# Patient Record
Sex: MALE | Race: BLACK OR AFRICAN AMERICAN | NOT HISPANIC OR LATINO | URBAN - METROPOLITAN AREA
[De-identification: names, ages, dates, MRNs, and addresses within clinical notes are randomized per-mention and may not be internally consistent; named-entity substitution may affect disease eponyms.]

---

## 2022-02-19 ENCOUNTER — EMERGENCY (EMERGENCY)
Facility: HOSPITAL | Age: 62
LOS: 1 days | Discharge: HOME | End: 2022-02-19
Admitting: EMERGENCY MEDICINE
Payer: MEDICAID

## 2022-02-19 VITALS
HEART RATE: 78 BPM | OXYGEN SATURATION: 98 % | WEIGHT: 160.06 LBS | TEMPERATURE: 98 F | HEIGHT: 72 IN | RESPIRATION RATE: 18 BRPM | DIASTOLIC BLOOD PRESSURE: 84 MMHG | SYSTOLIC BLOOD PRESSURE: 128 MMHG

## 2022-02-19 PROCEDURE — 99283 EMERGENCY DEPT VISIT LOW MDM: CPT

## 2022-02-19 NOTE — ED ADULT TRIAGE NOTE - CHIEF COMPLAINT QUOTE
[place].  with 18g to L forearm placed. pt asked what brought him into the emergency department, has no medical complaints at this time. no obvious signs of injury/trauma.

## 2022-02-19 NOTE — ED PROVIDER NOTE - CLINICAL SUMMARY MEDICAL DECISION MAKING FREE TEXT BOX
60 y/o M presents to ED via EMS. Pt denies any complaints.  MSE performed and pt discharged.    Patient opt to leave the emergency department without discharge instructions.  The patient is aware discharge instructions can be accessed electronically via the [employer] or may chose to return to the emergency department or Health Records department for a copy at a later date.

## 2022-02-19 NOTE — ED PROVIDER NOTE - OBJECTIVE STATEMENT
60 y/o M, undomiciled, [place] [facility]  Per EMS, bystanders called EMS but the patient does not have a complaint.  The patient is oriented and states he did not request medical attention.

## 2022-02-19 NOTE — ED PROVIDER NOTE - PHYSICAL EXAMINATION
Constitutional: Well appearing, poor hygiene, well groomed and dressed, awake and alert to person  Head:  NC/AT, symmetric, neck supple  ENMT: Airway patent    Eyes:  Clear bilaterally, pupils, equal 3mm bilat    Cardiac:  Normal rate  Respiratory:  Normal respiratory rate, depth and effort  GI:   Abd soft, non-distended    MSK:  atraumatic  Neuro:  clear speech, alert and oriented to person  Skin:  Skin normal color for race, warm, dry and intact

## 2022-02-19 NOTE — ED PROVIDER NOTE - PATIENT PORTAL LINK FT
You can access the FollowMyHealth Patient Portal offered by [facility] by registering at the following website: [email or web address]. By joining [employer]’s FollowMyHealth portal, you will also be able to view your health information using other applications (apps) compatible with our system.

## 2022-02-23 DIAGNOSIS — Z00.00 ENCOUNTER FOR GENERAL ADULT MEDICAL EXAMINATION WITHOUT ABNORMAL FINDINGS: ICD-10-CM

## 2022-02-23 DIAGNOSIS — Z59.00 HOMELESSNESS UNSPECIFIED: ICD-10-CM

## 2022-02-23 SDOH — ECONOMIC STABILITY - HOUSING INSECURITY: HOMELESSNESS UNSPECIFIED: Z59.00

## 2023-02-10 ENCOUNTER — EMERGENCY (EMERGENCY)
Facility: HOSPITAL | Age: 63
LOS: 1 days | Discharge: HOME | End: 2023-02-10
Admitting: EMERGENCY MEDICINE
Payer: MEDICAID

## 2023-02-10 VITALS
SYSTOLIC BLOOD PRESSURE: 138 MMHG | WEIGHT: 160.06 LBS | OXYGEN SATURATION: 98 % | DIASTOLIC BLOOD PRESSURE: 88 MMHG | HEART RATE: 67 BPM | HEIGHT: 72 IN | TEMPERATURE: 98 F | RESPIRATION RATE: 18 BRPM

## 2023-02-10 PROCEDURE — 99284 EMERGENCY DEPT VISIT MOD MDM: CPT

## 2023-02-10 NOTE — ED PROVIDER NOTE - OBJECTIVE STATEMENT
62-year-old male, undomiciled, presenting to the emergency room after he was found sleeping in front of a local hotel.  Patient states he was only looking for a place to sleep and does not have any medical complaints.  He is requesting discharge at this time.

## 2023-02-10 NOTE — ED PROVIDER NOTE - CLINICAL SUMMARY MEDICAL DECISION MAKING FREE TEXT BOX
Patient presented to the emergency room after he was found sleeping in front of a hotel.  Patient is noted to be in his usual state of health and denies having any medical complaints.  Vital signs in triage are found to be stable.  Will discharge patient and he leaves in no acute distress.

## 2023-02-10 NOTE — ED ADULT NURSE NOTE - OBJECTIVE STATEMENT
Pt walked in. Upon primary rn assessment, pt refusing to answer RN questions. When asked why pt is here, pt refusing to answer. Turning his body away, closing his eyes, and going back to sleep. Pt uncooperative.

## 2023-02-10 NOTE — ED ADULT TRIAGE NOTE - CHIEF COMPLAINT QUOTE
BIBA from [facility] after staff called due to patient sleeping. pt denies drug or alcohol use, sleepy in triage.  in the field. uncooperative in triage. as per EMS, ambulatory on scene. no obvious signs of injury/trauma noted

## 2023-02-10 NOTE — ED ADULT TRIAGE NOTE - CCCP TRG CHIEF CMPLNT
medical evaluation

## 2023-02-10 NOTE — ED PROVIDER NOTE - PATIENT PORTAL LINK FT
You can access the FollowMyHealth Patient Portal offered by [facility] by registering at the following website: [email or web address]. By joining [email or web address]’s FollowMyHealth portal, you will also be able to view your health information using other applications (apps) compatible with our system.

## 2023-02-10 NOTE — ED ADULT NURSE NOTE - CHIEF COMPLAINT QUOTE
BIBA from [facility] after staff called due to patient sleeping. pt denies drug or alcohol use, sleepy in triage.  in the field. uncooperative in triage. as per EMS, ambulatory on scene. no obvious signs of injury/trauma noted

## 2023-02-10 NOTE — ED PROVIDER NOTE - PHYSICAL EXAMINATION
VITAL SIGNS: I have reviewed nursing notes and confirm.  CONSTITUTIONAL: Well-developed; well-nourished; in no acute distress.  SKIN: No acute rash.  HEAD: Normocephalic; atraumatic.  CARD: No extremity cyanosis.  RESP: Speaks in full, clear sentences.  EXT: Moves all extremities normally.  NEURO: Alert, oriented. Grossly unremarkable. No focal deficits. Fluent speech.   PSYCH: Cooperative, appropriate. Mood and affect wnl.

## 2023-02-13 DIAGNOSIS — Z00.00 ENCOUNTER FOR GENERAL ADULT MEDICAL EXAMINATION WITHOUT ABNORMAL FINDINGS: ICD-10-CM

## 2023-02-13 DIAGNOSIS — Z59.00 HOMELESSNESS UNSPECIFIED: ICD-10-CM

## 2023-02-13 SDOH — ECONOMIC STABILITY - HOUSING INSECURITY: HOMELESSNESS UNSPECIFIED: Z59.00

## 2025-03-01 ENCOUNTER — EMERGENCY (EMERGENCY)
Facility: HOSPITAL | Age: 65
LOS: 1 days | Discharge: HOME | End: 2025-03-01
Attending: EMERGENCY MEDICINE | Admitting: EMERGENCY MEDICINE
Payer: MEDICAID

## 2025-03-01 VITALS
TEMPERATURE: 98 F | RESPIRATION RATE: 15 BRPM | OXYGEN SATURATION: 98 % | SYSTOLIC BLOOD PRESSURE: 132 MMHG | WEIGHT: 176.37 LBS | HEIGHT: 70 IN | DIASTOLIC BLOOD PRESSURE: 73 MMHG | HEART RATE: 71 BPM

## 2025-03-01 VITALS
SYSTOLIC BLOOD PRESSURE: 117 MMHG | TEMPERATURE: 98 F | HEART RATE: 82 BPM | RESPIRATION RATE: 16 BRPM | OXYGEN SATURATION: 97 % | DIASTOLIC BLOOD PRESSURE: 88 MMHG

## 2025-03-01 LAB
ALBUMIN SERPL ELPH-MCNC: 3.9 G/DL — SIGNIFICANT CHANGE UP (ref 3.4–5)
ALP SERPL-CCNC: 103 U/L — SIGNIFICANT CHANGE UP (ref 40–120)
ALT FLD-CCNC: 19 U/L — SIGNIFICANT CHANGE UP (ref 12–42)
ANION GAP SERPL CALC-SCNC: 12 MMOL/L — SIGNIFICANT CHANGE UP (ref 9–16)
APPEARANCE UR: CLEAR — SIGNIFICANT CHANGE UP
AST SERPL-CCNC: 45 U/L — HIGH (ref 15–37)
BASOPHILS # BLD AUTO: 0.03 K/UL — SIGNIFICANT CHANGE UP (ref 0–0.2)
BASOPHILS NFR BLD AUTO: 0.5 % — SIGNIFICANT CHANGE UP (ref 0–2)
BILIRUB SERPL-MCNC: 0.9 MG/DL — SIGNIFICANT CHANGE UP (ref 0.2–1.2)
BILIRUB UR-MCNC: NEGATIVE — SIGNIFICANT CHANGE UP
BUN SERPL-MCNC: 18 MG/DL — SIGNIFICANT CHANGE UP (ref 7–23)
CALCIUM SERPL-MCNC: 9.1 MG/DL — SIGNIFICANT CHANGE UP (ref 8.5–10.5)
CHLORIDE SERPL-SCNC: 106 MMOL/L — SIGNIFICANT CHANGE UP (ref 96–108)
CO2 SERPL-SCNC: 24 MMOL/L — SIGNIFICANT CHANGE UP (ref 22–31)
COLOR SPEC: YELLOW — SIGNIFICANT CHANGE UP
CREAT SERPL-MCNC: 1.15 MG/DL — SIGNIFICANT CHANGE UP (ref 0.5–1.3)
DIFF PNL FLD: NEGATIVE — SIGNIFICANT CHANGE UP
EGFR: 72 ML/MIN/1.73M2 — SIGNIFICANT CHANGE UP
EOSINOPHIL # BLD AUTO: 0.03 K/UL — SIGNIFICANT CHANGE UP (ref 0–0.5)
EOSINOPHIL NFR BLD AUTO: 0.5 % — SIGNIFICANT CHANGE UP (ref 0–6)
GLUCOSE SERPL-MCNC: 87 MG/DL — SIGNIFICANT CHANGE UP (ref 70–99)
GLUCOSE UR QL: NEGATIVE MG/DL — SIGNIFICANT CHANGE UP
HCT VFR BLD CALC: 31.5 % — LOW (ref 39–50)
HGB BLD-MCNC: 9.6 G/DL — LOW (ref 13–17)
IMM GRANULOCYTES # BLD AUTO: 0.02 K/UL — SIGNIFICANT CHANGE UP (ref 0–0.07)
IMM GRANULOCYTES NFR BLD AUTO: 0.3 % — SIGNIFICANT CHANGE UP (ref 0–0.9)
KETONES UR-MCNC: 15 MG/DL
LACTATE BLDV-MCNC: 1 MMOL/L — SIGNIFICANT CHANGE UP (ref 0.5–2)
LACTATE BLDV-MCNC: 3.3 MMOL/L — HIGH (ref 0.5–2)
LEUKOCYTE ESTERASE UR-ACNC: NEGATIVE — SIGNIFICANT CHANGE UP
LYMPHOCYTES # BLD AUTO: 0.92 K/UL — LOW (ref 1–3.3)
LYMPHOCYTES NFR BLD AUTO: 14.7 % — SIGNIFICANT CHANGE UP (ref 13–44)
MCHC RBC-ENTMCNC: 25.4 PG — LOW (ref 27–34)
MCHC RBC-ENTMCNC: 30.5 G/DL — LOW (ref 32–36)
MCV RBC AUTO: 83.3 FL — SIGNIFICANT CHANGE UP (ref 80–100)
MONOCYTES # BLD AUTO: 0.41 K/UL — SIGNIFICANT CHANGE UP (ref 0–0.9)
MONOCYTES NFR BLD AUTO: 6.6 % — SIGNIFICANT CHANGE UP (ref 2–14)
NEUTROPHILS # BLD AUTO: 4.83 K/UL — SIGNIFICANT CHANGE UP (ref 1.8–7.4)
NEUTROPHILS NFR BLD AUTO: 77.4 % — HIGH (ref 43–77)
NITRITE UR-MCNC: NEGATIVE — SIGNIFICANT CHANGE UP
NRBC # BLD AUTO: 0 K/UL — SIGNIFICANT CHANGE UP (ref 0–0)
NRBC # FLD: 0 K/UL — SIGNIFICANT CHANGE UP (ref 0–0)
NRBC BLD AUTO-RTO: 0 /100 WBCS — SIGNIFICANT CHANGE UP (ref 0–0)
PCP SPEC-MCNC: SIGNIFICANT CHANGE UP
PH UR: 5.5 — SIGNIFICANT CHANGE UP (ref 5–8)
PLATELET # BLD AUTO: 205 K/UL — SIGNIFICANT CHANGE UP (ref 150–400)
PMV BLD: 10.6 FL — SIGNIFICANT CHANGE UP (ref 7–13)
POTASSIUM SERPL-MCNC: 4.2 MMOL/L — SIGNIFICANT CHANGE UP (ref 3.5–5.3)
POTASSIUM SERPL-SCNC: 4.2 MMOL/L — SIGNIFICANT CHANGE UP (ref 3.5–5.3)
PROT SERPL-MCNC: 7.1 G/DL — SIGNIFICANT CHANGE UP (ref 6.4–8.2)
PROT UR-MCNC: NEGATIVE MG/DL — SIGNIFICANT CHANGE UP
RBC # BLD: 3.78 M/UL — LOW (ref 4.2–5.8)
RBC # FLD: 15.4 % — HIGH (ref 10.3–14.5)
SODIUM SERPL-SCNC: 142 MMOL/L — SIGNIFICANT CHANGE UP (ref 132–145)
SP GR SPEC: 1.02 — SIGNIFICANT CHANGE UP (ref 1–1.03)
TROPONIN I, HIGH SENSITIVITY RESULT: 25.6 NG/L — SIGNIFICANT CHANGE UP
UROBILINOGEN FLD QL: 1 MG/DL — SIGNIFICANT CHANGE UP (ref 0.2–1)
WBC # BLD: 6.24 K/UL — SIGNIFICANT CHANGE UP (ref 3.8–10.5)
WBC # FLD AUTO: 6.24 K/UL — SIGNIFICANT CHANGE UP (ref 3.8–10.5)

## 2025-03-01 PROCEDURE — 99285 EMERGENCY DEPT VISIT HI MDM: CPT

## 2025-03-01 PROCEDURE — 71046 X-RAY EXAM CHEST 2 VIEWS: CPT | Mod: 26

## 2025-03-01 RX ORDER — LEVETIRACETAM 10 MG/ML
500 INJECTION, SOLUTION INTRAVENOUS ONCE
Refills: 0 | Status: COMPLETED | OUTPATIENT
Start: 2025-03-01 | End: 2025-03-01

## 2025-03-01 RX ORDER — ACETAMINOPHEN 500 MG/5ML
650 LIQUID (ML) ORAL ONCE
Refills: 0 | Status: COMPLETED | OUTPATIENT
Start: 2025-03-01 | End: 2025-03-01

## 2025-03-01 RX ADMIN — LEVETIRACETAM 500 MILLIGRAM(S): 10 INJECTION, SOLUTION INTRAVENOUS at 11:47

## 2025-03-01 RX ADMIN — Medication 650 MILLIGRAM(S): at 11:47

## 2025-03-01 RX ADMIN — Medication 1000 MILLILITER(S): at 11:48

## 2025-03-01 NOTE — ED ADULT NURSE NOTE - CHIEF COMPLAINT QUOTE
patient BIBA form [employer] c/o syncope; witnesses saw pt lean against wall and slide down to floor; BGL 78 on scene with EMS; +nausea; hx seizures on keppra

## 2025-03-01 NOTE — ED ADULT TRIAGE NOTE - BEFAST EYES
There are no preventive care reminders to display for this patient.    Patient is up to date, no discussion needed.        Over the last 2 weeks, how often have you been bothered by the following problems?          PHQ2 Score:  0  1. Little interest or pleasure in doing things?:  0  2. Feeling down, depressed, or hopeless?:  0            No

## 2025-03-01 NOTE — ED PROVIDER NOTE - ATTENDING APP SHARED VISIT CONTRIBUTION OF CARE
Pt seen and eval in bed 6 of [facility], pt missed his Keppra dose today, utox positive for polysubstance

## 2025-03-01 NOTE — ED ADULT NURSE NOTE - SUICIDE SCREENING QUESTION 2
Show Aperture Variable?: Yes Detail Level: Detailed Render Post-Care Instructions In Note?: no Duration Of Freeze Thaw-Cycle (Seconds): 10 Number Of Freeze-Thaw Cycles: 1 freeze-thaw cycle Post-Care Instructions: I reviewed with the patient in detail post-care instructions. Patient is to wear sunprotection, and avoid picking at any of the treated lesions. Pt may apply Vaseline to crusted or scabbing areas. Consent: The patient's consent was obtained including but not limited to risks of crusting, scabbing, blistering, scarring, darker or lighter pigmentary change, recurrence, incomplete removal and infection. No

## 2025-03-01 NOTE — ED ADULT NURSE NOTE - NSFALLRISKINTERV_ED_ALL_ED

## 2025-03-01 NOTE — ED PROVIDER NOTE - PATIENT PORTAL LINK FT
You can access the FollowMyHealth Patient Portal offered by [facility] by registering at the following website: [email or web address]. By joining [employer]’s FollowMyHealth portal, you will also be able to view your health information using other applications (apps) compatible with our system.

## 2025-03-01 NOTE — ED ADULT TRIAGE NOTE - CHIEF COMPLAINT QUOTE
patient BIBA form [employer] c/o syncope; witnesses saw pt lean against wall and slide down to floor; BGL 78 on scene with EMS; +nausea; hx seizures on keppra 2

## 2025-03-01 NOTE — ED PROVIDER NOTE - CLINICAL SUMMARY MEDICAL DECISION MAKING FREE TEXT BOX
Patient here for syncope event, feels like his previous seizure event.  Patient admits to not taking his Keppra today because he rushed out of home.  Patient takes Keppra 500 twice a day.  Exam found patient well-appearing, EKG sinus rhythm, not in acute distress, neurologically intact.  Vital signs stable.  Likely seizure.  Will check lactate, Trop to rule out ACS.

## 2025-03-01 NOTE — ED PROVIDER NOTE - OBJECTIVE STATEMENT
64-year-old male with history of seizure on Keppra, history of drug use on methadone, complaining of a syncope episode today while walking in [employer].  Patient did not take his Keppra this morning and passed out at [employer].  Patient did not  remember how it happened but feels like his usual seizure as it happened a few times in the past.    Patient complaining of whole body ache, but denies headache, shortness of breath, chest pain, dizziness, nausea/vomiting,  numbness/tingling,  blurry vision.

## 2025-03-01 NOTE — ED PROVIDER NOTE - NSFOLLOWUPINSTRUCTIONS_ED_ALL_ED_FT
Advance activity as tolerated. Continue all previously prescribed medications as directed unless otherwise instructed. Follow up with your primary care physician in 48-72 hours- bring copies of your results. Return to the ER for worsening or persistent symptoms, and/or ANY NEW OR CONCERNING SYMPTOMS, including chest pain, shortness of breath, dizziness, nausea, vomiting, diarrhea, unable to walk, weakness, numbness/tingling.       Seizure, Adult  A seizure is a sudden burst of abnormal activity in the brain. Seizures usually last from 30 seconds to 2 minutes.    There are many types of seizures. And they can cause many different symptoms.    What are the causes?  Common causes of a seizure include:  Fever or infection.  Problems that affect the brain. These may include:  A brain or head injury.  A stroke.  A brain tumor.  Low levels of blood sugar or salt.  Kidney problems or liver problems.  Some inherited conditions. These are passed down from parent to child.  Problems with a substance, such as:  Having a reaction to a drug or a medicine.  Stopping the use of a substance all of a sudden. When this causes problems, it's called withdrawal.  Disorders that affect how you develop, such as autism spectrum disorder or cerebral palsy.  Sometimes, the cause may not be known. Some people who have a seizure never have another one. A person who has repeated seizures over time without a clear cause has a condition called epilepsy.    What increases the risk?  Having a family history of epilepsy.  Having had a tonic–clonic seizure before. This type of seizure causes:  The muscles of the whole body to tighten, or contract.  Loss of consciousness.  Having a head injury or a stroke in the past.  Having had too little oxygen at birth.  What are the signs or symptoms?  The symptoms vary depending on the type of seizure you have.    Symptoms during a seizure    Having convulsions. This means shaking with fast, jerky movements of muscles.  Stiffness of the body.  Breathing problems.  Being confused.  Staring or not responding to sound or touch.  Head nodding, eye blinking, eye twitching, or fast eye movements.  Drooling, grunting, or making clicking sounds with your mouth.  Losing control of when you pee or poop.  Symptoms before a seizure    Feeling afraid, worried, or nervous.  Feeling like you may vomit.  Vertigo. This feels like:  You are moving when you're not.  Things around you are moving when they're not.  Déjà vu. This is a feeling of having seen or heard something before.  Odd tastes or smells.  Changes in how you see. You may see flashing lights or spots.  Symptoms after a seizure    Being confused.  Feeling sleepy.  Headache.  Sore muscles.  How is this diagnosed?  A seizure may be diagnosed based on:  A description of your symptoms. Video of your seizures can be helpful.  Your medical history.  A physical exam.  Tests, such as:  Blood tests.  CT scan.  MRI.  Electroencephalogram, or EEG. This test measures electrical activity in the brain.  A test of your spinal fluid. This is called a spinal tap or lumbar puncture.  How is this treated?  If your seizure stops on its own, you will not need treatment. If your seizure lasts longer than 5 minutes, you'll normally need treatment. This may include:  Medicines given through an IV.  Avoiding things, such as medicines, that are known to cause your seizures.  Medicines to prevent seizures. These are called antiepileptics.  A device to prevent or control seizures.  Eating foods that are low in carbohydrates and high in fat (ketogenic diet).  Surgery. This is sometimes needed if you keep having seizures.  Follow these instructions at home:  Medicines    Take your medicines only as told by your health care provider.  Avoid anything that may keep your medicine from working, such as alcohol.  Activity    Follow your provider's advice about driving, swimming, and doing other things that would be dangerous if you had a seizure. Wait until your provider says it's safe for you to do these things.  If you live in the [place] ask your local department of motor vehicles (DMV) when you can drive.  Get enough rest and sleep. Not getting enough sleep can make seizures more likely to happen.  Teaching others    A person helping someone who is on the ground having a seizure. The helper carefully turns the person onto their side.  Teach friends and family what to do if you have a seizure. Tell them to:  Help you get down to the ground safely.  Protect your head and body.  Loosen any clothing around your neck.  Turn you on your side. This helps keep your airway clear if you vomit.  Know whether or not you need emergency care.  Stay with you until you are better.  Also, tell them what not to do if you have a seizure. Tell them:  They should not hold you down.  They should not put anything in your mouth.  General instructions    Avoid anything that has caused you to have seizures.  Keep a seizure diary. Write down:  What you remember about each seizure.  What you think might have caused each seizure.  Keep all follow-up visits. Your provider may need to monitor your progress.  Contact a health care provider if:  You have another seizure or seizures. Call each time you have a seizure.  You have a change in how often or when you have seizures.  You keep having seizures with treatment.  You have symptoms of being sick or having an infection.  You are not able to take your medicine.  Get help right away if:  You have or someone has seen you have:  A seizure that lasts longer than 5 minutes.  Many seizures in a row and you don't feel better between seizures.  A seizure that makes it harder to breathe.  A seizure that leaves you unable to speak or use a part of your body.  You didn't wake up right away after a seizure.  You injure yourself during a seizure.  You have confusion or pain right after a seizure.  These symptoms may be an emergency. Call 911 right away.  Do not wait to see if the symptoms will go away.  Do not drive yourself to the hospital.  This information is not intended to replace advice given to you by your health care provider. Make sure you discuss any questions you have with your health care provider.

## 2025-03-04 DIAGNOSIS — R52 PAIN, UNSPECIFIED: ICD-10-CM

## 2025-03-04 DIAGNOSIS — R55 SYNCOPE AND COLLAPSE: ICD-10-CM

## 2025-03-04 DIAGNOSIS — G40.909 EPILEPSY, UNSPECIFIED, NOT INTRACTABLE, WITHOUT STATUS EPILEPTICUS: ICD-10-CM

## 2025-05-31 ENCOUNTER — INPATIENT (INPATIENT)
Facility: HOSPITAL | Age: 65
LOS: 11 days | Discharge: NURSING, REHAB OR LONG-TERM CARE | DRG: 881 | End: 2025-06-12
Attending: PSYCHIATRY & NEUROLOGY | Admitting: PSYCHIATRY & NEUROLOGY
Payer: COMMERCIAL

## 2025-05-31 VITALS
WEIGHT: 190.04 LBS | DIASTOLIC BLOOD PRESSURE: 94 MMHG | SYSTOLIC BLOOD PRESSURE: 160 MMHG | TEMPERATURE: 98 F | RESPIRATION RATE: 18 BRPM | OXYGEN SATURATION: 98 % | HEART RATE: 79 BPM

## 2025-05-31 RX ORDER — LEVETIRACETAM 10 MG/ML
1000 INJECTION, SOLUTION INTRAVENOUS ONCE
Refills: 0 | Status: COMPLETED | OUTPATIENT
Start: 2025-05-31 | End: 2025-05-31

## 2025-05-31 NOTE — ED ADULT TRIAGE NOTE - CHIEF COMPLAINT QUOTE
here for ams- was found unresponsive- admits to snorting drugs.- h/o seizures- was given 16mg IN Narcan by [facility]-  FS in the field 138. Pt arrives A+Ox4 and ambulatory

## 2025-06-01 DIAGNOSIS — F41.9 ANXIETY DISORDER, UNSPECIFIED: ICD-10-CM

## 2025-06-01 DIAGNOSIS — F32.9 MAJOR DEPRESSIVE DISORDER, SINGLE EPISODE, UNSPECIFIED: ICD-10-CM

## 2025-06-01 DIAGNOSIS — F19.90 OTHER PSYCHOACTIVE SUBSTANCE USE, UNSPECIFIED, UNCOMPLICATED: ICD-10-CM

## 2025-06-01 DIAGNOSIS — F11.20 OPIOID DEPENDENCE, UNCOMPLICATED: ICD-10-CM

## 2025-06-01 LAB
ALBUMIN SERPL ELPH-MCNC: 3.9 G/DL — SIGNIFICANT CHANGE UP (ref 3.4–5)
ALP SERPL-CCNC: 97 U/L — SIGNIFICANT CHANGE UP (ref 40–120)
ALT FLD-CCNC: 17 U/L — SIGNIFICANT CHANGE UP (ref 12–42)
ANION GAP SERPL CALC-SCNC: 8 MMOL/L — LOW (ref 9–16)
APAP SERPL-MCNC: <2 UG/ML — LOW (ref 10–30)
APPEARANCE UR: CLEAR — SIGNIFICANT CHANGE UP
AST SERPL-CCNC: 23 U/L — SIGNIFICANT CHANGE UP (ref 15–37)
BASOPHILS # BLD AUTO: 0.02 K/UL — SIGNIFICANT CHANGE UP (ref 0–0.2)
BASOPHILS NFR BLD AUTO: 0.3 % — SIGNIFICANT CHANGE UP (ref 0–2)
BILIRUB SERPL-MCNC: 0.5 MG/DL — SIGNIFICANT CHANGE UP (ref 0.2–1.2)
BILIRUB UR-MCNC: NEGATIVE — SIGNIFICANT CHANGE UP
BUN SERPL-MCNC: 14 MG/DL — SIGNIFICANT CHANGE UP (ref 7–23)
CALCIUM SERPL-MCNC: 9.1 MG/DL — SIGNIFICANT CHANGE UP (ref 8.5–10.5)
CHLORIDE SERPL-SCNC: 109 MMOL/L — HIGH (ref 96–108)
CO2 SERPL-SCNC: 29 MMOL/L — SIGNIFICANT CHANGE UP (ref 22–31)
COLOR SPEC: YELLOW — SIGNIFICANT CHANGE UP
CREAT SERPL-MCNC: 1 MG/DL — SIGNIFICANT CHANGE UP (ref 0.5–1.3)
DIFF PNL FLD: NEGATIVE — SIGNIFICANT CHANGE UP
EGFR: 85 ML/MIN/1.73M2 — SIGNIFICANT CHANGE UP
EGFR: 85 ML/MIN/1.73M2 — SIGNIFICANT CHANGE UP
EOSINOPHIL # BLD AUTO: 0.03 K/UL — SIGNIFICANT CHANGE UP (ref 0–0.5)
EOSINOPHIL NFR BLD AUTO: 0.5 % — SIGNIFICANT CHANGE UP (ref 0–6)
ETHANOL SERPL-MCNC: <3 MG/DL — SIGNIFICANT CHANGE UP
GLUCOSE SERPL-MCNC: 88 MG/DL — SIGNIFICANT CHANGE UP (ref 70–99)
GLUCOSE UR QL: NEGATIVE MG/DL — SIGNIFICANT CHANGE UP
HCT VFR BLD CALC: 33.6 % — LOW (ref 39–50)
HGB BLD-MCNC: 10.4 G/DL — LOW (ref 13–17)
IMM GRANULOCYTES # BLD AUTO: 0.01 K/UL — SIGNIFICANT CHANGE UP (ref 0–0.07)
IMM GRANULOCYTES NFR BLD AUTO: 0.2 % — SIGNIFICANT CHANGE UP (ref 0–0.9)
KETONES UR QL: 15 MG/DL
LACTATE BLDV-MCNC: 1.2 MMOL/L — SIGNIFICANT CHANGE UP (ref 0.5–2)
LEUKOCYTE ESTERASE UR-ACNC: ABNORMAL
LYMPHOCYTES # BLD AUTO: 0.97 K/UL — LOW (ref 1–3.3)
LYMPHOCYTES NFR BLD AUTO: 15.3 % — SIGNIFICANT CHANGE UP (ref 13–44)
MCHC RBC-ENTMCNC: 25.9 PG — LOW (ref 27–34)
MCHC RBC-ENTMCNC: 31 G/DL — LOW (ref 32–36)
MCV RBC AUTO: 83.8 FL — SIGNIFICANT CHANGE UP (ref 80–100)
MONOCYTES # BLD AUTO: 0.4 K/UL — SIGNIFICANT CHANGE UP (ref 0–0.9)
MONOCYTES NFR BLD AUTO: 6.3 % — SIGNIFICANT CHANGE UP (ref 2–14)
NEUTROPHILS # BLD AUTO: 4.9 K/UL — SIGNIFICANT CHANGE UP (ref 1.8–7.4)
NEUTROPHILS NFR BLD AUTO: 77.4 % — HIGH (ref 43–77)
NITRITE UR-MCNC: NEGATIVE — SIGNIFICANT CHANGE UP
NRBC # BLD AUTO: 0 K/UL — SIGNIFICANT CHANGE UP (ref 0–0)
NRBC # FLD: 0 K/UL — SIGNIFICANT CHANGE UP (ref 0–0)
NRBC BLD AUTO-RTO: 0 /100 WBCS — SIGNIFICANT CHANGE UP (ref 0–0)
PCP SPEC-MCNC: SIGNIFICANT CHANGE UP
PH UR: 6.5 — SIGNIFICANT CHANGE UP (ref 5–8)
PLATELET # BLD AUTO: 218 K/UL — SIGNIFICANT CHANGE UP (ref 150–400)
PMV BLD: 10.1 FL — SIGNIFICANT CHANGE UP (ref 7–13)
POTASSIUM SERPL-MCNC: 4.6 MMOL/L — SIGNIFICANT CHANGE UP (ref 3.5–5.3)
POTASSIUM SERPL-SCNC: 4.6 MMOL/L — SIGNIFICANT CHANGE UP (ref 3.5–5.3)
PROT SERPL-MCNC: 7.3 G/DL — SIGNIFICANT CHANGE UP (ref 6.4–8.2)
PROT UR-MCNC: ABNORMAL MG/DL
RBC # BLD: 4.01 M/UL — LOW (ref 4.2–5.8)
RBC # FLD: 13.9 % — SIGNIFICANT CHANGE UP (ref 10.3–14.5)
SALICYLATES SERPL-MCNC: 1.8 MG/DL — LOW (ref 2.8–20)
SARS-COV-2 RNA SPEC QL NAA+PROBE: SIGNIFICANT CHANGE UP
SODIUM SERPL-SCNC: 146 MMOL/L — HIGH (ref 132–145)
SP GR SPEC: 1.02 — SIGNIFICANT CHANGE UP (ref 1–1.03)
UROBILINOGEN FLD QL: 1 MG/DL — SIGNIFICANT CHANGE UP (ref 0.2–1)
WBC # BLD: 6.33 K/UL — SIGNIFICANT CHANGE UP (ref 3.8–10.5)
WBC # FLD AUTO: 6.33 K/UL — SIGNIFICANT CHANGE UP (ref 3.8–10.5)

## 2025-06-01 RX ORDER — ESCITALOPRAM OXALATE 20 MG/1
10 TABLET ORAL DAILY
Refills: 0 | Status: DISCONTINUED | OUTPATIENT
Start: 2025-06-01 | End: 2025-06-03

## 2025-06-01 RX ORDER — HALOPERIDOL 10 MG/1
5 TABLET ORAL EVERY 6 HOURS
Refills: 0 | Status: DISCONTINUED | OUTPATIENT
Start: 2025-06-01 | End: 2025-06-12

## 2025-06-01 RX ORDER — GABAPENTIN 400 MG/1
200 CAPSULE ORAL
Refills: 0 | Status: DISCONTINUED | OUTPATIENT
Start: 2025-06-01 | End: 2025-06-04

## 2025-06-01 RX ORDER — LEVETIRACETAM 10 MG/ML
1000 INJECTION, SOLUTION INTRAVENOUS
Refills: 0 | Status: DISCONTINUED | OUTPATIENT
Start: 2025-06-01 | End: 2025-06-03

## 2025-06-01 RX ORDER — ACETAMINOPHEN 500 MG/5ML
650 LIQUID (ML) ORAL EVERY 6 HOURS
Refills: 0 | Status: DISCONTINUED | OUTPATIENT
Start: 2025-06-01 | End: 2025-06-01

## 2025-06-01 RX ORDER — ACETAMINOPHEN 500 MG/5ML
650 LIQUID (ML) ORAL EVERY 6 HOURS
Refills: 0 | Status: DISCONTINUED | OUTPATIENT
Start: 2025-06-01 | End: 2025-06-12

## 2025-06-01 RX ORDER — MAGNESIUM, ALUMINUM HYDROXIDE 200-200 MG
30 TABLET,CHEWABLE ORAL EVERY 6 HOURS
Refills: 0 | Status: DISCONTINUED | OUTPATIENT
Start: 2025-06-01 | End: 2025-06-12

## 2025-06-01 RX ORDER — LORAZEPAM 4 MG/ML
2 VIAL (ML) INJECTION EVERY 6 HOURS
Refills: 0 | Status: DISCONTINUED | OUTPATIENT
Start: 2025-06-01 | End: 2025-06-07

## 2025-06-01 RX ORDER — HYDROXYZINE HYDROCHLORIDE 25 MG/1
50 TABLET, FILM COATED ORAL EVERY 8 HOURS
Refills: 0 | Status: DISCONTINUED | OUTPATIENT
Start: 2025-06-01 | End: 2025-06-12

## 2025-06-01 RX ADMIN — LEVETIRACETAM 1000 MILLIGRAM(S): 10 INJECTION, SOLUTION INTRAVENOUS at 00:19

## 2025-06-01 RX ADMIN — HYDROXYZINE HYDROCHLORIDE 50 MILLIGRAM(S): 25 TABLET, FILM COATED ORAL at 21:09

## 2025-06-01 RX ADMIN — LEVETIRACETAM 1000 MILLIGRAM(S): 10 INJECTION, SOLUTION INTRAVENOUS at 21:09

## 2025-06-01 RX ADMIN — GABAPENTIN 200 MILLIGRAM(S): 400 CAPSULE ORAL at 21:09

## 2025-06-01 NOTE — ED BEHAVIORAL HEALTH ASSESSMENT NOTE - SUMMARY
Mr Contreras is a 64 year old man with a history of major depression, Anxiety , Opioid Use disorder on Methadone and other substance use disorders who was  brought in by EMS for altered mental status. According to the ED team, patient  was , found on the streets unconscious and was said to have been using drugs however patient reports that he has a  seizure disorder and is on Keppra , Psychiatry consult was called for suicidal ideations since patient reports that  he would get hit by a truck or hurt someone.  Patient appears to have significant feelings of depression and anxiety , with suicidal ideations but has no intent or plan. it is possible that these symptoms are worsening in the context of suboptimal treatment's  with low dose Zoloft however patient 's ongoing substance use ( given the UDS positive for Cocaine and possibly fentanyl   ) likely is contributory.   At this time, patient is considered a danger to himself  and needs inpatient psychiatric hospitalization for medication management and symptoms stabilization. Patient does not want to continue with Zoft and is amenable to changing to Lexapro . he is also agreeable to starting gabapentin as an offl lable use for anxiety . Patient reports that he is on methadone 10mg for opioid use disorder . we will need to call the rehab that patient came from to confirm the dose of this medication so it can be continued . Mr Contreras is a 64 year old man with a history of major depression, Anxiety , Opioid Use disorder on Methadone and other substance use disorders and a Seizure disorder on Keppra  who was  brought in by EMS for altered mental status. According to the ED team, patient  was , found on the streets unconscious and was said to have been using drugs however patient reports that he has a  seizure disorder and is on Keppra , Psychiatry consult was called for suicidal ideations since patient reports that  he would get hit by a truck or hurt someone.  Patient appears to have significant feelings of depression and anxiety , with suicidal ideations but has no intent or plan. it is possible that these symptoms are worsening in the context of suboptimal treatment's  with low dose Zoloft however patient 's ongoing substance use ( given the UDS positive for Cocaine and possibly fentanyl   ) likely is contributory.   At this time, patient is considered a danger to himself  and needs inpatient psychiatric hospitalization for medication management and symptoms stabilization. Patient does not want to continue with Zoft and is amenable to changing to Lexapro . he is also agreeable to starting gabapentin as an offl lable use for anxiety . Patient reports that he is on methadone 10mg for opioid use disorder . we will need to call the rehab that patient came from to confirm the dose of this medication so it can be continued .
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
## 2025-06-01 NOTE — ED BEHAVIORAL HEALTH ASSESSMENT NOTE - ACCOMPANIED BY
----- Message from Diamante Stock MD sent at 2/26/2020 10:56 AM CST -----  cotesting in 5 yrs  
Normal pap letter mailed to pt.  
Self

## 2025-06-01 NOTE — ED BEHAVIORAL HEALTH ASSESSMENT NOTE - OTHER PAST PSYCHIATRIC HISTORY (INCLUDE DETAILS REGARDING ONSET, COURSE OF ILLNESS, INPATIENT/OUTPATIENT TREATMENT)
Patient reports up to 5 in patient psychiatric hospitalizations and 2 suicide attempts in the past , last one was 1 and a half years ago by cutting with a razor Patient reports up to 5 inpatient psychiatric hospitalizations and 2 suicide attempts in the past , last one was 1 and a half years ago by cutting with a razor

## 2025-06-01 NOTE — ED PROVIDER NOTE - ATTENDING APP SHARED VISIT CONTRIBUTION OF CARE
64-year-old male with a history of seizures on Keppra, opioid abuse on methadone, and depression was brought in by EMS for altered mental status after being found unconscious on the street. He received 16 mg of Narcan intranasally en route. Upon arrival, he was alert and oriented, admitted to snorting drugs, and complained of body pain. He expressed suicidal and homicidal ideations, stating potential harm to himself or others if discharged. The physical exam revealed that he was calm, cooperative, and ambulatory with no focal neurological deficits. Laboratory tests confirmed a positive cocaine metabolite and methadone presence. CBC and CMP revealed mild anemia. A consultation with telepsychiatry was initiated to assess suicidality, and the patient was placed under observation for further evaluation.    I saw and evaluated the patient. I discussed the case with the GIA, agreed with the findings, and helped develop the plan of care as documented in the GIA’s note. I agree with the findings and plan of care as documented in the GIA’s note.

## 2025-06-01 NOTE — ED BEHAVIORAL HEALTH ASSESSMENT NOTE - CURRENT MEDICATION
Patient reports that he is currently on Zoloft 50mg daily for depression and methadone 10mg daily for Opioid use disorder . patient reports that he is slowly being weaned off this medications and will be completely off by his graduation from the rehab in July
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
## 2025-06-01 NOTE — ED BEHAVIORAL HEALTH ASSESSMENT NOTE - MEDICAL ISSUES AND PLAN (INCLUDE STANDING AND PRN MEDICATION)
As per ED team We recommned calling the pharmacy that patient gets his medication from to confirm the dose of keppra and prescribe accordingly. For now , a dose of Keppra 1000mg BID  has een started , please call the neurology  team in the morning for reassessment  .

## 2025-06-01 NOTE — ED PROVIDER NOTE - NS ED MD DISPO DIVISION OBS
LOS: 3 days   Patient Care Team:  Marisol Larson APRN as PCP - General    Chief Complaint: Wants to go home, is refusing thoracentesis.  However was very combative with nursing this morning and through the night.  Was very hard to reorient at times.  He appears quite angry this morning    Subjective     Interval History:     Patient Complaints: Feels that he is not getting any better, still shortness of breath  Patient Denies: No chest pain per se  History taken from: patient    Review of Systems:    All systems were reviewed and negative except for: No hemoptysis, still with somewhat of a dry cough and some shortness of breath.  See the rest of interval history.    Objective     Vital Signs  Temp:  [97.8 °F (36.6 °C)-98.2 °F (36.8 °C)] 97.8 °F (36.6 °C)  Heart Rate:  [60-74] 73  Resp:  [16-22] 17  BP: (115-154)/(72-92) 152/74    Physical Exam:     General Appearance:    Alert, cooperative, in no acute distress, was cooperative with me today but appeared somewhat angry and anxious about the thoracentesis   Head:    Normocephalic, without obvious abnormality, atraumatic   Eyes:            Conjunctivae and sclerae normal, no   icterus, no pallor, corneas clear, PERRLA   Throat:   No oral lesions, no thrush, oral mucosa moist   Neck:   No adenopathy, supple, trachea midline, no thyromegaly, no   carotid bruit, no JVD   Lungs:     Clear to auscultation,respirations regular, even and                  unlabored, somewhat distant on the left    Heart:    Regular rhythm and normal rate, normal S1 and S2, no            murmur, no gallop, no rub, no click   Chest Wall:    No abnormalities observed   Abdomen:     Normal bowel sounds, no masses, no organomegaly, soft        non-tender, non-distended, no guarding, no rebound                tenderness   Rectal:     Deferred   Extremities:   Moves all extremities well, no edema, no cyanosis, no             redness   Pulses:   Pulses palpable and equal bilaterally   Skin:   No  bleeding, bruising or rash   Lymph nodes:   No palpable adenopathy   Neurologic:   Cranial nerves 2 - 12 grossly intact, sensation intact, DTR       present and equal bilaterally   Radiology:  Xr Chest 1 View    Result Date: 7/30/2020   1. Cardiomegaly with pulmonary vascular congestion. 2. Left basilar opacity suggesting combination of moderate pleural effusion with overlying infiltrate or atelectasis.  Electronically Signed By-Mandeep Connor On:7/30/2020 10:35 AM This report was finalized on [id number] by  Mandeep Connor, .    Ct Chest Pulmonary Embolism    Result Date: 7/31/2020  1. No pulmonary embolism, aortic dissection, or aortic aneurysm. 2. Cardiomegaly. Moderate left pleural fluid collection, 4 to 5 cm depth. 3. Compressive atelectasis in the left lower lobe. 4. Mild mosaic attenuation of the lungs, compatible with asthma in the correct clinical setting. Electronically signed by:  Rodrigo Alva M.D.  7/31/2020 2:34 AM    Xr Chest Pa & Lateral    Result Date: 8/2/2020  1. Moderate left pleural effusion is not significant changed. 2. Cardiomegaly with pulmonary vascular redistribution. Negative for pulmonary edema..  Electronically Signed By-Nehal Way On:8/2/2020 9:00 PM This report was finalized on [id number] by  Nehal Way, .         Results Review:     I reviewed the patient's new clinical results.  I reviewed the patient's new imaging results and agree with the interpretation.    Medication Review:   Scheduled Meds:  atorvastatin 10 mg Oral Nightly   carvedilol 25 mg Oral BID With Meals   dilTIAZem  mg Oral Q24H   furosemide 40 mg Intravenous BID   hydrALAZINE 75 mg Oral TID   isosorbide mononitrate 30 mg Oral BID - Nitrates   potassium chloride 20 mEq Oral BID   rivaroxaban 20 mg Oral Daily With Dinner   spironolactone 25 mg Oral Daily   triamcinolone  Topical Q12H     Continuous Infusions:   PRN Meds:.hydrALAZINE  •  [COMPLETED] Insert peripheral IV **AND** sodium chloride    Labs:  Lab  Results (last 24 hours)     Procedure Component Value Units Date/Time    BUN [[id number]]  (Normal) Collected:  08/03/20 0322    Specimen:  Blood Updated:  08/03/20 0718     BUN 21 mg/dL     Comprehensive Metabolic Panel [[id number]]  (Abnormal) Collected:  08/03/20 0322    Specimen:  Blood Updated:  08/03/20 0453     Glucose 104 mg/dL      BUN --     Comment: Testing performed by alternate method        Creatinine 1.09 mg/dL      Sodium 140 mmol/L      Potassium 4.2 mmol/L      Chloride 103 mmol/L      CO2 24.0 mmol/L      Calcium 9.0 mg/dL      Total Protein 7.3 g/dL      Albumin 3.60 g/dL      ALT (SGPT) 19 U/L      AST (SGOT) 18 U/L      Alkaline Phosphatase 60 U/L      Total Bilirubin 0.5 mg/dL      eGFR Non African Amer 68 mL/min/1.73      Globulin 3.7 gm/dL      A/G Ratio 1.0 g/dL      BUN/Creatinine Ratio --     Comment: Testing not performed        Anion Gap 13.0 mmol/L     Narrative:       GFR Normal >60  Chronic Kidney Disease <60  Kidney Failure <15      CBC & Differential [[id number]] Collected:  08/03/20 0322    Specimen:  Blood Updated:  08/03/20 0431    Narrative:       The following orders were created for panel order CBC & Differential.  Procedure                               Abnormality         Status                     ---------                               -----------         ------                     CBC Auto Differential[[id number]]        Abnormal            Final result                 Please view results for these tests on the individual orders.    CBC Auto Differential [[id number]]  (Abnormal) Collected:  08/03/20 0322    Specimen:  Blood Updated:  08/03/20 0431     WBC 8.40 10*3/mm3      RBC 5.02 10*6/mm3      Hemoglobin 13.6 g/dL      Hematocrit 41.7 %      MCV 83.0 fL      MCH 27.1 pg      MCHC 32.7 g/dL      RDW 17.1 %      RDW-SD 49.0 fl      MPV 10.6 fL      Platelets 170 10*3/mm3      Neutrophil % 69.4 %      Lymphocyte % 15.0 %      Monocyte % 13.0 %      Eosinophil % 2.0 %       Basophil % 0.6 %      Neutrophils, Absolute 5.80 10*3/mm3      Lymphocytes, Absolute 1.30 10*3/mm3      Monocytes, Absolute 1.10 10*3/mm3      Eosinophils, Absolute 0.20 10*3/mm3      Basophils, Absolute 0.10 10*3/mm3      nRBC 0.0 /100 WBC     Ammonia [[id number]]  (Normal) Collected:  08/03/20 0322    Specimen:  Blood Updated:  08/03/20 0354     Ammonia 31 umol/L            Assessment/Plan       Acute congestive heart failure (CMS/HCC)  Pulmonary edema with left pleural effusion, minimally changed since admission  Hypertensive urgency  Medical noncompliance  Atrial fibrillation, rate controlled  Confusion and combativeness    Following per cardiology, thank you for your kind input:  Chronic atrial fibrillation (CMS/HCC)     Chronic diastolic CHF (congestive heart failure) (CMS/HCC)    CKD (chronic kidney disease) stage 3, GFR 30-59 ml/min (CMS/HCC)    Essential hypertension    Gambling disorder, persistent    Shortness of breath    Coronary artery disease involving native coronary artery of native heart without angina pectoris    Psoriasis    History of CVA (cerebrovascular accident)    DDD (degenerative disc disease), lumbosacral    Peripheral polyneuropathy    GERD (gastroesophageal reflux disease)    Medically noncompliant    Acute respiratory distress    Unstable angina (CMS/HCC)    Cervical disc disorder with radiculopathy    Completed stroke (CMS/HCC)    Degeneration of intervertebral disc    Excessive anticoagulation    Impaired left ventricular function    Liver lesion    Lumbar radiculopathy    Cervical myelopathy (CMS/HCC)    Lumbosacral radiculopathy    Spinal stenosis of lumbar region    Obesity    Mild cognitive impairment    Thoracic back pain    Thoracic disc disease with myelopathy    Paroxysmal atrial fibrillation (CMS/HCC)    Essential (primary) hypertension       Continue current approach.  Patient initially said he would not get thoracentesis.  I spent nearly 15 minutes explaining to him the  procedure and why it was so important for him to get this and comply with his medical interventions along with getting a thoracentesis.  Despite aggressive medical treatment, the pleural effusion has still not really changed this could be a either loculated effusion, there could be an underlying tumor and possibly an underlying infection as well and I advised him that if he refused the procedure and went home there is an excellent chance he would end up back in the hospital.  I also advised him that his refusal to cooperate with our instructions and our recommendations could place him at significant risk of morbidity and mortality.  I did use a word death if he continues to refuse treatment.    For his combativeness, I will add PRN risperidone and have psychiatry see him as well as patient is unknown to me.  She seemed to be more at ease when I left the room today.  I also advised him to have his questions ready for the pulmonologist who could tell him even more about the intended procedure.    Discussed plan as above with nursing and the patient.            Paty Polk,   08/03/20  08:39         [facility]

## 2025-06-01 NOTE — ED BEHAVIORAL HEALTH ASSESSMENT NOTE - RISK ASSESSMENT
Risk factors for suicide in this patient are his psychiatric diagnosis , on going substance use , suicidal ideations, past suicide attempts, history of trauma , ongoing substance use

## 2025-06-01 NOTE — ED BEHAVIORAL HEALTH ASSESSMENT NOTE - DESCRIPTION
No agitation reported Patient reports that he grew up in [place], went as far as the 6th grade but got his GED Patient denies

## 2025-06-01 NOTE — ED PROVIDER NOTE - OBJECTIVE STATEMENT
64-year-old male with history of seizure on Keppra, opioid abuse on methadone, depression, brought in by EMS for AMS.  Patient was found unconscious on the street, given 16 mg Narcan intranasally while en route. Pt arrived to the ED A+O x4, admits to snorting drugs, c/o feeling pain all over his body, and requesting Keppra as he takes it twice a day. Pt then states that he has SI/HI, if he goes out there he would get hit by a truck or hurt someone. Denies previous suicidal attempts.

## 2025-06-01 NOTE — ED CDU PROVIDER INITIAL DAY NOTE - CLINICAL SUMMARY MEDICAL DECISION MAKING FREE TEXT BOX
64-year-old male with a history of seizures on Keppra, opioid abuse on methadone, and depression was brought in by EMS for altered mental status after being found unconscious on the street. He received 16 mg of Narcan intranasally en route. Upon arrival, he was alert and oriented, admitted to snorting drugs, and complained of body pain. He expressed suicidal and homicidal ideations, stating potential harm to himself or others if discharged. The physical exam revealed that he was calm, cooperative, and ambulatory with no focal neurological deficits. Laboratory tests confirmed a positive cocaine metabolite and methadone presence. CBC and CMP revealed mild anemia. A consultation with telepsychiatry was initiated to assess suicidality, and the patient was placed under observation for further evaluation.

## 2025-06-01 NOTE — ED ADULT NURSE REASSESSMENT NOTE - NS ED NURSE REASSESS COMMENT FT1
@0000 PATIENT SI/HI. PATIENT STATED IF HE GOES BACK OUTSIDE, HE COULD KILL HIMSELF OR SOMEONE. PATIENT SPOKE WITH MARI HART  CHARGE NURSE AWARE.  PATIENT 1 TO 1 AND MOVED TO ROOM 23.  LABS DRAWN AND SENT.  EKG COMPLETED.  PATIENT GIVEN FLUIDS/FOOD.  KEPPRA GIVEN. BELONGING REMOVED BY SECURITY
[facility]

## 2025-06-01 NOTE — ED BEHAVIORAL HEALTH ASSESSMENT NOTE - NSBHSACONSEQUENCE_PSY_A_CORE FT
Patient reports that he was initally admitted to [facility] for a year and is now about to complete the second year at his current residence  and will graduate in July of this year . Patient reports that he was initially admitted to [facility] for a year and is now about to complete the second year at his current residence  and will graduate in July of this year .

## 2025-06-01 NOTE — ED ADULT NURSE NOTE - CHIEF COMPLAINT QUOTE
here for ams- was found unresponsive- admits to snorting drugs.- h/o seizures- was given 16mg IN Narcan by [facility]-  FS in the field 138. Pt arrives A+Ox4 and ambulatory

## 2025-06-01 NOTE — ED PROVIDER NOTE - PHYSICAL EXAMINATION
CONSTITUTIONAL: Well-appearing; well-nourished; in no apparent distress. Non-toxic appearing.   NEURO: Alert & oriented. Gait steady without assistance. Sensory and motor functions are grossly intact.  PSYCH: Mood appropriate. Thought processes intact.   NECK: Supple  CARD: Regular rate and rhythm, no murmurs  RESP: No accessory muscle use; breath sounds clear and equal bilaterally; no wheezes, rhonchi, or rales     ABD: Soft; non-distended; non-tender.   MUSCULOSKELETAL/EXTREMITIES: FROM in all four extremities   SKIN: Warm; dry; no apparent lesions or exudate

## 2025-06-01 NOTE — ED BEHAVIORAL HEALTH ASSESSMENT NOTE - SUBSTANCE ISSUES AND PLAN (INCLUDE STANDING AND PRN MEDICATION)
We recommend confirmation of the dose of patient's methadone from his substance use disorder rehab  and then restarting this medication  accordingly

## 2025-06-01 NOTE — ED BEHAVIORAL HEALTH ASSESSMENT NOTE - PSYCHIATRIC ISSUES AND PLAN (INCLUDE STANDING AND PRN MEDICATION)
1. We recommend d/c Zoloft  2. We recommend starting Lexapro 10mg P.o Q AM ( after breakfast ).4. We recommend starting Gabapentin 200mg P.o BID with the plan to titrate up dose accordingly for anxiety .. 5. 5.We recommend melatonin 5 –10 mg P.O bedtime to regulate sleep wake cycle 6. We recommend behavioral interventions, and environmental modifications to help patient's orientation and help her feel safe in addition to implementing delirium precautions as per nursing staff.

## 2025-06-01 NOTE — ED PROVIDER NOTE - CLINICAL SUMMARY MEDICAL DECISION MAKING FREE TEXT BOX
64-year-old male with history of seizure on Keppra, opioid abuse on methadone, depression, brought in by EMS for AMS.  Patient was found unconscious on the street, given 16 mg Narcan intranasally while en route. Pt arrived to the ED A+O x4, admits to snorting drugs, c/o feeling pain all over his body, and requesting Keppra as he takes it twice a day. Pt then states that he has SI/HI, if he goes out there he would get hit by a truck or hurt someone. Denies previous suicidal attempts.  Exam found pt calm and cooperative, not in acute distress on stretcher, head is atraumatic, no focal neurologic deficits, hr rrr, lung sounds clear B/L, PERRL, ambulatory with steady gait, thoughts linear.   questionable seizure activity earlier vs opioid overdose; will check cbc, cmp, lactate, urine tox.  continue monitor.  will give keppra, and psych clearance.
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
## 2025-06-02 DIAGNOSIS — G40.909 EPILEPSY, UNSPECIFIED, NOT INTRACTABLE, WITHOUT STATUS EPILEPTICUS: ICD-10-CM

## 2025-06-02 RX ADMIN — GABAPENTIN 200 MILLIGRAM(S): 400 CAPSULE ORAL at 10:25

## 2025-06-02 RX ADMIN — HYDROXYZINE HYDROCHLORIDE 50 MILLIGRAM(S): 25 TABLET, FILM COATED ORAL at 21:39

## 2025-06-02 RX ADMIN — LEVETIRACETAM 1000 MILLIGRAM(S): 10 INJECTION, SOLUTION INTRAVENOUS at 21:39

## 2025-06-02 RX ADMIN — GABAPENTIN 200 MILLIGRAM(S): 400 CAPSULE ORAL at 21:39

## 2025-06-02 RX ADMIN — LEVETIRACETAM 1000 MILLIGRAM(S): 10 INJECTION, SOLUTION INTRAVENOUS at 10:25

## 2025-06-02 RX ADMIN — ESCITALOPRAM OXALATE 10 MILLIGRAM(S): 20 TABLET ORAL at 10:25
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
## 2025-06-02 NOTE — BH INPATIENT PSYCHIATRY ASSESSMENT NOTE - NSBHMETABOLIC_PSY_ALL_CORE_FT
BMI: BMI (kg/m2): 25 (06-01-25 @ 15:44)  HbA1c:   Glucose: POCT Blood Glucose.: 119 mg/dL (05-31-25 @ 22:39)    BP: 141/71 (06-02-25 @ 09:14) (102/64 - 160/94)Vital Signs Last 24 Hrs  T(C): 36.8 (06-02-25 @ 09:14), Max: 36.8 (06-02-25 @ 09:14)  T(F): 98.2 (06-02-25 @ 09:14), Max: 98.2 (06-02-25 @ 09:14)  HR: 55 (06-02-25 @ 09:14) (55 - 61)  BP: 141/71 (06-02-25 @ 09:14) (125/72 - 141/71)  BP(mean): --  RR: 18 (06-01-25 @ 15:44) (18 - 18)  SpO2: 98% (06-02-25 @ 09:14) (98% - 100%)      Lipid Panel:  BMI: BMI (kg/m2): 25 (06-01-25 @ 15:44)  HbA1c:   Glucose: POCT Blood Glucose.: 119 mg/dL (05-31-25 @ 22:39)    BP: 148/10 (06-02-25 @ 16:12) (102/64 - 160/94)Vital Signs Last 24 Hrs  T(C): 36.9 (06-02-25 @ 16:12), Max: 36.9 (06-02-25 @ 16:12)  T(F): 98.5 (06-02-25 @ 16:12), Max: 98.5 (06-02-25 @ 16:12)  HR: 65 (06-02-25 @ 16:12) (55 - 65)  BP: 148/10 (06-02-25 @ 16:12) (141/71 - 148/10)  BP(mean): --  RR: --  SpO2: 97% (06-02-25 @ 16:12) (97% - 98%)      Lipid Panel:

## 2025-06-02 NOTE — BH INPATIENT PSYCHIATRY ASSESSMENT NOTE - OTHER PAST PSYCHIATRIC HISTORY (INCLUDE DETAILS REGARDING ONSET, COURSE OF ILLNESS, INPATIENT/OUTPATIENT TREATMENT)
Patient reports up to 5 inpatient psychiatric hospitalizations and 2 suicide attempts in the past , last one was 1 and a half years ago by cutting with a razor

## 2025-06-02 NOTE — BH SOCIAL WORK INITIAL PSYCHOSOCIAL EVALUATION - NSBHSACONSEQUENCE_PSY_A_CORE FT
Per chart: Patient reports that he was initially admitted to [facility] for a year and is now about to complete the second year at his current residence  and will graduate in July of this year .

## 2025-06-02 NOTE — BH INPATIENT PSYCHIATRY ASSESSMENT NOTE - NSBHATTESTBILLING_PSY_A_CORE
04288-Zapibylpvzx diagnostic evaluation with medical services 99223-Initial OBS or IP - high complexity OR  mins
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
## 2025-06-02 NOTE — BH SOCIAL WORK INITIAL PSYCHOSOCIAL EVALUATION - NSBHEMPLOYEDYN_PSY_ALL_CORE
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
## 2025-06-02 NOTE — BH INPATIENT PSYCHIATRY ASSESSMENT NOTE - CURRENT MEDICATION
MEDICATIONS  (STANDING):  escitalopram 10 milliGRAM(s) Oral daily  gabapentin 200 milliGRAM(s) Oral two times a day  levETIRAcetam 1000 milliGRAM(s) Oral two times a day    MEDICATIONS  (PRN):  acetaminophen     Tablet .. 650 milliGRAM(s) Oral every 6 hours PRN Moderate Pain (4 - 6)  aluminum hydroxide/magnesium hydroxide/simethicone Suspension 30 milliLiter(s) Oral every 6 hours PRN Dyspepsia  haloperidol     Tablet 5 milliGRAM(s) Oral every 6 hours PRN agitation  hydrOXYzine hydrochloride 50 milliGRAM(s) Oral every 8 hours PRN anxiety and insomnia  LORazepam     Tablet 2 milliGRAM(s) Oral every 6 hours PRN agitaton
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
## 2025-06-02 NOTE — BH INPATIENT PSYCHIATRY ASSESSMENT NOTE - NSBHCHARTREVIEWVS_PSY_A_CORE FT
Vital Signs Last 24 Hrs  T(C): 36.8 (06-02-25 @ 09:14), Max: 36.8 (06-02-25 @ 09:14)  T(F): 98.2 (06-02-25 @ 09:14), Max: 98.2 (06-02-25 @ 09:14)  HR: 55 (06-02-25 @ 09:14) (55 - 61)  BP: 141/71 (06-02-25 @ 09:14) (125/72 - 141/71)  BP(mean): --  RR: 18 (06-01-25 @ 15:44) (18 - 18)  SpO2: 98% (06-02-25 @ 09:14) (98% - 100%)     Vital Signs Last 24 Hrs  T(C): 36.9 (06-02-25 @ 16:12), Max: 36.9 (06-02-25 @ 16:12)  T(F): 98.5 (06-02-25 @ 16:12), Max: 98.5 (06-02-25 @ 16:12)  HR: 65 (06-02-25 @ 16:12) (55 - 65)  BP: 148/10 (06-02-25 @ 16:12) (141/71 - 148/10)  BP(mean): --  RR: --  SpO2: 97% (06-02-25 @ 16:12) (97% - 98%)

## 2025-06-02 NOTE — BH SOCIAL WORK INITIAL PSYCHOSOCIAL EVALUATION - INSURANCE HELP - DETAILS
Per chart review, the patient is listed as Self-Pay. This SW emailed the [facility] Finance Team for follow-up.

## 2025-06-02 NOTE — BH SOCIAL WORK INITIAL PSYCHOSOCIAL EVALUATION - NSBHEDULEVEL_PSY_ALL_CORE
Per chart: Patient reports that he grew up in [place], went as far as the 6th grade but got his GED.
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
## 2025-06-02 NOTE — BH SOCIAL WORK INITIAL PSYCHOSOCIAL EVALUATION - NSBHHOUSECOMMENTFT_PSY_ALL_CORE
The patient reports that he was supposed to graduate from his 2-year residential drug program by 6/5/25, but he believes it may be extended due to his drug relapse in April 2025 and shortly prior to this currently hospitalization.

## 2025-06-02 NOTE — BH INPATIENT PSYCHIATRY ASSESSMENT NOTE - NSBHPHYSICALEXAM_PSY_ALL_CORE
CONSTITUTIONAL: well- developed, well- nourished, appropriately dressed with appropriate affect, NAD   SKIN: several scars ranging from 0.5- 6cm on bilateral forearms.  No ulcers appreciated on ankles/ feet.   HEAD: NC/AT  EYES: Conjunctiva clear. Anicteric sclera. pupils symmetric and reactive to light B/L   ENT: Airway clear. Normal voice. MMM.  RESPIRATORY:  Normal respiratory rate and effort. Lungs clear to auscultation B/L  CARDIOVASCULAR:  RRR, no murmurs/ rubs/ gallops appreciated. Chest nontender to palpation.   GI:  Abdomen soft, nontender.  Bowel sounds present x 4   MSK: Neck supple.  No LE edema or calf tenderness. No joint swelling or ROM limitation. 2+ pulses in B/L DP.   NEURO: A&O x4. Speech clear. 5/5 strength in B/L upper and lower extremities. CN II-XII grossly intact. sensation intact in B/L feet.

## 2025-06-02 NOTE — BH INPATIENT PSYCHIATRY ASSESSMENT NOTE - RISK ASSESSMENT
Risk factors for suicide in this patient are his psychiatric diagnosis , on going substance use , suicidal ideations, past suicide attempts, history of trauma , ongoing substance use Static: gender, hx of mental illness, past SA, substance use, hx of incarcerations, trauma/abuse, co-morbid medical issues  Modifiable: access to treatment/medications, mood symptoms  Protective: help-seeking, future oriented

## 2025-06-02 NOTE — BH SOCIAL WORK INITIAL PSYCHOSOCIAL EVALUATION - NSBHFINANCEBENEFIT_PSY_ALL_CORE
Per chart review, the patient is Self-Pay. This SW notified the [facility] Finance Team./Yes

## 2025-06-02 NOTE — ED CDU PROVIDER DISPOSITION NOTE - CLINICAL COURSE
Patient with a history of polysubstance abuse presents to the ER with suicidal and homicidal ideation.  Patient was observed in the ED seen by telepsych patient to be voluntarily admitted to [facility] for psychiatric admission.

## 2025-06-02 NOTE — BH SOCIAL WORK INITIAL PSYCHOSOCIAL EVALUATION - NSBHSAOPI_PSY_A_CORE FT
Per chart review: Patent reports that he has been sober from heroin use for about a year and a half.

## 2025-06-02 NOTE — BH SOCIAL WORK INITIAL PSYCHOSOCIAL EVALUATION - NSBHSACOC_PSY_A_CORE FT
During this assessment, the patient denied recent cocaine use. However, when told by the tx team that his Utox was positive for cocaine, he stated that a friend gave him a cigarette which may have been laced with cocaine & fentanyl.

## 2025-06-02 NOTE — BH INPATIENT PSYCHIATRY ASSESSMENT NOTE - NSBHATTESTAPPBILLTIME_PSY_A_CORE
I attest my time as GIA is greater than 50% of the total combined time spent on qualifying patient care activities. I have reviewed and verified the documentation.

## 2025-06-02 NOTE — BH SOCIAL WORK INITIAL PSYCHOSOCIAL EVALUATION - OTHER PAST PSYCHIATRIC HISTORY (INCLUDE DETAILS REGARDING ONSET, COURSE OF ILLNESS, INPATIENT/OUTPATIENT TREATMENT)
Per chart review: Mr Contreras is a 64 year old Black man single, has no children , reportedly resides in a long term rehab called [facility] , has  a history of major depression, Anxiety , Opioid Use disorder on Methadone and other substance use disorders and a Seizure disorder on Keppra who was  brought in by EMS for altered mental status. According to the ED team, patient  was , found on the streets unconscious and was said to have been using drugs however patient reports that he has a  seizure disorder and is on Keppra , Psychiatry consult was called for suicidal ideations since patient reports that  he would get hit by a truck or hurt someone.

## 2025-06-02 NOTE — BH INPATIENT PSYCHIATRY ASSESSMENT NOTE - NSBHSACONSEQUENCE_PSY_A_CORE FT
Patient reports that he was initially admitted to [facility] for a year and is now about to complete the second year at his current residence  and will graduate in July of this year .

## 2025-06-02 NOTE — BH SOCIAL WORK INITIAL PSYCHOSOCIAL EVALUATION - NSBHHOUSEFACILITYFT_PSY_ALL_CORE
[facility] ([address] [place] [address], [place] [address])  (An [facility])

## 2025-06-03 LAB — LEVETIRACETAM SERPL-MCNC: 7.9 UG/ML — LOW (ref 10–40)

## 2025-06-03 RX ORDER — ESCITALOPRAM OXALATE 20 MG/1
15 TABLET ORAL DAILY
Refills: 0 | Status: DISCONTINUED | OUTPATIENT
Start: 2025-06-03 | End: 2025-06-12

## 2025-06-03 RX ADMIN — GABAPENTIN 200 MILLIGRAM(S): 400 CAPSULE ORAL at 10:07

## 2025-06-03 RX ADMIN — GABAPENTIN 200 MILLIGRAM(S): 400 CAPSULE ORAL at 21:27

## 2025-06-03 RX ADMIN — LEVETIRACETAM 1000 MILLIGRAM(S): 10 INJECTION, SOLUTION INTRAVENOUS at 10:08

## 2025-06-03 RX ADMIN — ESCITALOPRAM OXALATE 10 MILLIGRAM(S): 20 TABLET ORAL at 10:08

## 2025-06-03 NOTE — CHART NOTE - NSCHARTNOTEFT_GEN_A_CORE
64 year old M, single, has no children, domiciled at long-term rehab facility, with PMH MDD, unspecified anxiety, OUD on methadone, hx other substance use (cocaine), hx multiple extended incarcerations, who was initially BIBEMS for AMS after being found down on the street. Upon evaluation, patient endorsed suicidal ideation and that if discharged, he would get hit by a truck or hurt someone else. Patient was medically cleared and transferred to [facility] under voluntary legal status for further treatment.  Utox positive for cocaine, methadone, fentanyl.  Pt reports he self-presented to the hospital requesting to speak to psychiatry; when asked about being found down he reports "catching a seizure" from an unknown substance he was given by a stranger.  Epilepsy consulted as patient reported to Psychiatry that he has a history of seizures and has taken Keppra in the past, but does not recall dose.  To quote "I take Keppra, give me the largest dose."  His seizure descriptions are vague and inconsistent, he "shakes" which he sometimes remembers, sometimes does not, and sees unusual "dreams" during his self-reported seizures.  He cited general location of his pharmacy, primary team called all pharmacies in area with no record of patient.  No past documentation in HIE.  In ED, patient started on Keppra 1000mg BID prophylactically.  s/p 4 doses since admission.    Epilepsy queried regarding how to proceed with Keppra given unclear history with no records uncovered of patient taking Keppra.  Indeed, Keppra serum level performed in hospital is low, suggesting patient had not been taking a sizeable dose of Keppra prior to presentation in ED.  Keppra 1000mg BID can be discontinued today, with routine EEG to be performed tomorrow in order to assess for epileptiform potential. yes
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
## 2025-06-03 NOTE — BH INPATIENT PSYCHIATRY PROGRESS NOTE - CURRENT MEDICATION
MEDICATIONS  (STANDING):  escitalopram 10 milliGRAM(s) Oral daily  gabapentin 200 milliGRAM(s) Oral two times a day    MEDICATIONS  (PRN):  acetaminophen     Tablet .. 650 milliGRAM(s) Oral every 6 hours PRN Moderate Pain (4 - 6)  aluminum hydroxide/magnesium hydroxide/simethicone Suspension 30 milliLiter(s) Oral every 6 hours PRN Dyspepsia  haloperidol     Tablet 5 milliGRAM(s) Oral every 6 hours PRN agitation  hydrOXYzine hydrochloride 50 milliGRAM(s) Oral every 8 hours PRN anxiety and insomnia  LORazepam     Tablet 2 milliGRAM(s) Oral every 6 hours PRN agitaton

## 2025-06-03 NOTE — BH INPATIENT PSYCHIATRY PROGRESS NOTE - NSBHCHARTREVIEWVS_PSY_A_CORE FT
Vital Signs Last 24 Hrs  T(C): 37.1 (06-03-25 @ 08:40), Max: 37.1 (06-03-25 @ 08:40)  T(F): 98.7 (06-03-25 @ 08:40), Max: 98.7 (06-03-25 @ 08:40)  HR: 66 (06-03-25 @ 08:40) (65 - 66)  BP: 133/80 (06-03-25 @ 08:40) (133/80 - 148/10)  BP(mean): --  RR: --  SpO2: 100% (06-03-25 @ 08:40) (97% - 100%)

## 2025-06-03 NOTE — BH INPATIENT PSYCHIATRY PROGRESS NOTE - NSBHMETABOLIC_PSY_ALL_CORE_FT
BMI: BMI (kg/m2): 25 (06-01-25 @ 15:44)  HbA1c:   Glucose: POCT Blood Glucose.: 119 mg/dL (05-31-25 @ 22:39)    BP: 133/80 (06-03-25 @ 08:40) (102/64 - 160/94)Vital Signs Last 24 Hrs  T(C): 37.1 (06-03-25 @ 08:40), Max: 37.1 (06-03-25 @ 08:40)  T(F): 98.7 (06-03-25 @ 08:40), Max: 98.7 (06-03-25 @ 08:40)  HR: 66 (06-03-25 @ 08:40) (65 - 66)  BP: 133/80 (06-03-25 @ 08:40) (133/80 - 148/10)  BP(mean): --  RR: --  SpO2: 100% (06-03-25 @ 08:40) (97% - 100%)      Lipid Panel:

## 2025-06-03 NOTE — BH INPATIENT PSYCHIATRY PROGRESS NOTE - PRN MEDS
MEDICATIONS  (PRN):  acetaminophen     Tablet .. 650 milliGRAM(s) Oral every 6 hours PRN Moderate Pain (4 - 6)  aluminum hydroxide/magnesium hydroxide/simethicone Suspension 30 milliLiter(s) Oral every 6 hours PRN Dyspepsia  haloperidol     Tablet 5 milliGRAM(s) Oral every 6 hours PRN agitation  hydrOXYzine hydrochloride 50 milliGRAM(s) Oral every 8 hours PRN anxiety and insomnia  LORazepam     Tablet 2 milliGRAM(s) Oral every 6 hours PRN agitaton

## 2025-06-04 RX ORDER — GABAPENTIN 400 MG/1
300 CAPSULE ORAL
Refills: 0 | Status: DISCONTINUED | OUTPATIENT
Start: 2025-06-04 | End: 2025-06-09

## 2025-06-04 RX ORDER — LEVETIRACETAM 10 MG/ML
1000 INJECTION, SOLUTION INTRAVENOUS
Refills: 0 | Status: DISCONTINUED | OUTPATIENT
Start: 2025-06-04 | End: 2025-06-12

## 2025-06-04 RX ADMIN — GABAPENTIN 200 MILLIGRAM(S): 400 CAPSULE ORAL at 10:37

## 2025-06-04 RX ADMIN — ESCITALOPRAM OXALATE 15 MILLIGRAM(S): 20 TABLET ORAL at 10:37

## 2025-06-04 RX ADMIN — GABAPENTIN 300 MILLIGRAM(S): 400 CAPSULE ORAL at 21:46

## 2025-06-04 RX ADMIN — LEVETIRACETAM 1000 MILLIGRAM(S): 10 INJECTION, SOLUTION INTRAVENOUS at 21:46

## 2025-06-04 NOTE — BH INPATIENT PSYCHIATRY PROGRESS NOTE - NSBHMETABOLIC_PSY_ALL_CORE_FT
BMI: BMI (kg/m2): 25 (06-01-25 @ 15:44)  HbA1c:   Glucose: POCT Blood Glucose.: 119 mg/dL (05-31-25 @ 22:39)    BP: 135/89 (06-04-25 @ 07:57) (111/76 - 148/10)Vital Signs Last 24 Hrs  T(C): 36.4 (06-04-25 @ 07:57), Max: 36.4 (06-04-25 @ 07:57)  T(F): 97.6 (06-04-25 @ 07:57), Max: 97.6 (06-04-25 @ 07:57)  HR: 69 (06-04-25 @ 07:57) (69 - 69)  BP: 135/89 (06-04-25 @ 07:57) (135/89 - 135/89)  BP(mean): --  RR: --  SpO2: 100% (06-04-25 @ 07:57) (100% - 100%)      Lipid Panel:

## 2025-06-04 NOTE — BH INPATIENT PSYCHIATRY PROGRESS NOTE - NSBHCHARTREVIEWVS_PSY_A_CORE FT
Vital Signs Last 24 Hrs  T(C): 36.4 (06-04-25 @ 07:57), Max: 36.4 (06-04-25 @ 07:57)  T(F): 97.6 (06-04-25 @ 07:57), Max: 97.6 (06-04-25 @ 07:57)  HR: 69 (06-04-25 @ 07:57) (69 - 69)  BP: 135/89 (06-04-25 @ 07:57) (135/89 - 135/89)  BP(mean): --  RR: --  SpO2: 100% (06-04-25 @ 07:57) (100% - 100%)
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
## 2025-06-04 NOTE — DIETITIAN NUTRITION RISK NOTIFICATION - TREATMENT: THE FOLLOWING DIET HAS BEEN RECOMMENDED
1. Continue Regular diet  >>RD will add strawberry vanilla smoothie 1x/day (230kcal, 17g protein per serving)   2. Encourage and monitor PO intake, honor preferences as able   3. Monitor wt trends, GI function, skin integrity  4. Monitor lytes, renal indices, blood glucose, LFTs    5. Pain and bowel regimen per team  patient instructed; verbal instruction; RD encouraged continued PO intake as tolerated in view of varied PO intake PTA. Pt verbalized appreciation and understanding.

## 2025-06-04 NOTE — CONSULT NOTE ADULT - ATTENDING COMMENTS
Pt reports having seizures being 6 months ago.  LEV/keppra 500mg bid initiated but continued to have seizures, but once at 1000mg, became seizure free for a few months.  However was still using illicits and alcohol and would still have a break-through, but did feel that if he did not, the LEV at 1000mg would work.  He is unaware whether LEV causes mood issues, because he is detoxing and that feeling would overwhelm anything else.    rEEG yesterday negative - though had recently LEV/gabapentin.    It is possible he has true epilepsy, late-onset, due to head injuries or anoxic injuries etc due to drug and alcohol abuse, in which case LEV could be helpful.  If seizures are only due to withdrawal, it may not be effective.  But not harmful unless it is contributing to negative mood effects/temperament.    Plan:  1) risk:benefit in favor of use of LEV at 1000mg bid at this time.  2) gabapentin may be helpful as an adjunct as for anxiolytic as a benefit.

## 2025-06-04 NOTE — DIETITIAN INITIAL EVALUATION ADULT - PERSON TAUGHT/METHOD
RD encouraged continued PO intake as tolerated in view of varied PO intake PTA. Pt verbalized appreciation and understanding./verbal instruction/patient instructed

## 2025-06-04 NOTE — EEG REPORT - NS EEG TEXT BOX
[facility] Department of Neurology  Inpatient Routine-Electroencephalography Report  [address] [place] [address], [address] [place] [id number], T: [phone]    Patient Name:	RAQUEL GUZMAN  :	1960  MRN:	[id number]    Study Start Date/Time:	2025, 12:44:56 AM    End Time (if applicable):    Referred by:  Dr. Kaden Hoang    Brief Clinical History:  RAQUEL GUZMAN is a 64 year old Male; study performed to investigate for seizures or markers of epilepsy.   Technologist notes: -  Diagnosis Code:  R56.9 convulsions/seizure  CPT:   81729 (awake/drowsy)     Pertinent Medication:  n/a    Acquisition Details:  Electroencephalography was acquired using a minimum of 21 channels on an [employer] system v 9.3.1 with electrode placement according to the standard International 10-20 system following ACNS (American Clinical Neurophysiology Society) guidelines.  Anterior temporal T1 and T2 electrodes were utilized whenever possible.  The [employer] automated spike & seizure detections were all reviewed in detail, in addition to the entire raw EEG.    Findings:  Background:  continuous, with predominantly alpha and beta frequencies.  Generalized Slowing:  None  Symmetry/Focality: No persistent asymmetries of voltage or frequency.      Voltage:  Normal (20+ uV)  Organization:  Appropriate anterior-posterior gradient  Posterior Dominant Rhythm:  10 Hz symmetric, well-organized, and well-modulated  Sleep:  Symmetric, synchronous spindles and K complexes.  Variability:   Yes		Reactivity:  Yes    Spontaneous Activity:  No epileptiform discharges   Events:  1)	No electrographic seizures or significant clinical events occurred during this study.  Provocations:  •	Hyperventilation: was not performed.  •	Photic stimulation: was not performed.  FINAL Impression:  Normalawake and/or drowsy routine EEG  1)	 Unremarkable awake recording.  2)	There were no findings of active epilepsy, however this alone does not rule out the diagnosis.         Yasmany Fox MD   Attending Neurologist, [facility]

## 2025-06-04 NOTE — DIETITIAN INITIAL EVALUATION ADULT - PERTINENT MEDS FT
MEDICATIONS  (STANDING):  escitalopram 15 milliGRAM(s) Oral daily  gabapentin 200 milliGRAM(s) Oral two times a day    MEDICATIONS  (PRN):  acetaminophen     Tablet .. 650 milliGRAM(s) Oral every 6 hours PRN Moderate Pain (4 - 6)  aluminum hydroxide/magnesium hydroxide/simethicone Suspension 30 milliLiter(s) Oral every 6 hours PRN Dyspepsia  haloperidol     Tablet 5 milliGRAM(s) Oral every 6 hours PRN agitation  hydrOXYzine hydrochloride 50 milliGRAM(s) Oral every 8 hours PRN anxiety and insomnia  LORazepam     Tablet 2 milliGRAM(s) Oral every 6 hours PRN agitaton
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
## 2025-06-04 NOTE — DIETITIAN INITIAL EVALUATION ADULT - NUTRITIONGOAL OUTCOME1
Pt to meet at least 75% of nutritional needs consistently  Reduce signs and symptoms of protein calorie malnutrition

## 2025-06-04 NOTE — DIETITIAN INITIAL EVALUATION ADULT - OTHER CALCULATIONS
pounds, %  Pt within % IBW thus actual body weight used for all calculations. Needs adjusted for age, malnutrition.

## 2025-06-04 NOTE — CONSULT NOTE ADULT - ASSESSMENT
64M w/ PMHx of seizures (Keppra 1g BID, gabapentin 300mg BID), MDD, anxiety, AUD, OUD on methadone, hx other substance use (cocaine), hx multiple extended incarcerations, BIBEMS for AMS after being found down on the street, admitted to psychiatry for suicidal and homicidal ideation. Epilepsy consulted regarding how to proceed with Keppra given unclear history. Patient states started to have seizures 6 months ago, when he was at detox facility or following drug/alcohol abuse. Reportedly compliant on medications but Keppra serum level in ED low. Keppra 1000mg BID was discontinued on 6/3 with routine EEG performed overnight without evidence of epileptiform potential.     Recommendation:  - continue with home Keppra 1g BID and gabapentin 300mg BID  - outpatient neurology follow up
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
## 2025-06-04 NOTE — DIETITIAN INITIAL EVALUATION ADULT - ADD RECOMMEND
1. Continue Regular diet  >>RD will add strawberry vanilla smoothie 1x/day (230kcal, 17g protein per serving)   2. Encourage and monitor PO intake, honor preferences as able   3. Monitor wt trends, GI function, skin integrity  4. Monitor lytes, renal indices, blood glucose, LFTs    5. Pain and bowel regimen per team

## 2025-06-05 RX ADMIN — HYDROXYZINE HYDROCHLORIDE 50 MILLIGRAM(S): 25 TABLET, FILM COATED ORAL at 00:39

## 2025-06-05 RX ADMIN — LEVETIRACETAM 1000 MILLIGRAM(S): 10 INJECTION, SOLUTION INTRAVENOUS at 19:57

## 2025-06-05 RX ADMIN — ESCITALOPRAM OXALATE 15 MILLIGRAM(S): 20 TABLET ORAL at 10:05

## 2025-06-05 RX ADMIN — GABAPENTIN 300 MILLIGRAM(S): 400 CAPSULE ORAL at 10:06

## 2025-06-05 RX ADMIN — GABAPENTIN 300 MILLIGRAM(S): 400 CAPSULE ORAL at 19:58

## 2025-06-05 NOTE — BH INPATIENT PSYCHIATRY PROGRESS NOTE - NSBHATTESTTYPEVISIT_PSY_A_CORE
Attending with Resident/Fellow/Student On-site Attending with Resident/Fellow/Student and GIA (99XXX codes)
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
## 2025-06-05 NOTE — BH TREATMENT PLAN - NSTXSUICIDINTERSW_PSY_ALL_CORE
This SW will ensure the patient is set up with a therapist + psychiatrist upon hospital discharge to address psychiatric concerns. 
This SW will ensure the patient is set up with a therapist + psychiatrist upon hospital discharge to address psychiatric concerns.

## 2025-06-05 NOTE — BH INPATIENT PSYCHIATRY PROGRESS NOTE - NSBHCHARTREVIEWVS_PSY_A_CORE FT
Vital Signs Last 24 Hrs  T(C): 36.7 (06-05-25 @ 09:27), Max: 37.3 (06-04-25 @ 17:20)  T(F): 98.1 (06-05-25 @ 09:27), Max: 99.1 (06-04-25 @ 17:20)  HR: 72 (06-05-25 @ 09:27) (62 - 72)  BP: 137/88 (06-05-25 @ 09:27) (137/88 - 147/94)  BP(mean): --  RR: 18 (06-05-25 @ 09:27) (18 - 18)  SpO2: 100% (06-05-25 @ 09:27) (100% - 100%)     Vital Signs Last 24 Hrs  T(C): 37 (06-09-25 @ 08:42), Max: 37 (06-09-25 @ 08:42)  T(F): 98.6 (06-09-25 @ 08:42), Max: 98.6 (06-09-25 @ 08:42)  HR: 75 (06-09-25 @ 08:42) (75 - 82)  BP: 119/80 (06-09-25 @ 08:42) (119/80 - 141/73)  BP(mean): --  RR: --  SpO2: 100% (06-09-25 @ 08:42) (95% - 100%)

## 2025-06-05 NOTE — BH INPATIENT PSYCHIATRY PROGRESS NOTE - PRN MEDS
MEDICATIONS  (PRN):  acetaminophen     Tablet .. 650 milliGRAM(s) Oral every 6 hours PRN Moderate Pain (4 - 6)  aluminum hydroxide/magnesium hydroxide/simethicone Suspension 30 milliLiter(s) Oral every 6 hours PRN Dyspepsia  haloperidol     Tablet 5 milliGRAM(s) Oral every 6 hours PRN agitation  hydrOXYzine hydrochloride 50 milliGRAM(s) Oral every 8 hours PRN anxiety and insomnia  LORazepam     Tablet 2 milliGRAM(s) Oral every 6 hours PRN agitaton   MEDICATIONS  (PRN):  acetaminophen     Tablet .. 650 milliGRAM(s) Oral every 6 hours PRN Moderate Pain (4 - 6)  aluminum hydroxide/magnesium hydroxide/simethicone Suspension 30 milliLiter(s) Oral every 6 hours PRN Dyspepsia  haloperidol     Tablet 5 milliGRAM(s) Oral every 6 hours PRN agitation  hydrOXYzine hydrochloride 50 milliGRAM(s) Oral every 8 hours PRN anxiety and insomnia

## 2025-06-05 NOTE — BH TREATMENT PLAN - NSTXSUICIDINTERPR_PSY_ALL_CORE
Pt will be invited and encouraged to attend all offered groups
Pt. will be invited and encouraged to join all offered groups
Pediatrics
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
## 2025-06-05 NOTE — BH INPATIENT PSYCHIATRY PROGRESS NOTE - NSBHATTESTCOMMENTATTENDFT_PSY_A_CORE
I was present for interaction with patient and PA-s and agree with notation. Recs from epilepsy team are appreciated and keppra and gabapentin to be restarted and increased respectively.

## 2025-06-05 NOTE — BH INPATIENT PSYCHIATRY PROGRESS NOTE - CURRENT MEDICATION
MEDICATIONS  (STANDING):  escitalopram 15 milliGRAM(s) Oral daily  gabapentin 300 milliGRAM(s) Oral two times a day  levETIRAcetam 1000 milliGRAM(s) Oral two times a day    MEDICATIONS  (PRN):  acetaminophen     Tablet .. 650 milliGRAM(s) Oral every 6 hours PRN Moderate Pain (4 - 6)  aluminum hydroxide/magnesium hydroxide/simethicone Suspension 30 milliLiter(s) Oral every 6 hours PRN Dyspepsia  haloperidol     Tablet 5 milliGRAM(s) Oral every 6 hours PRN agitation  hydrOXYzine hydrochloride 50 milliGRAM(s) Oral every 8 hours PRN anxiety and insomnia  LORazepam     Tablet 2 milliGRAM(s) Oral every 6 hours PRN agitaton   MEDICATIONS  (STANDING):  escitalopram 15 milliGRAM(s) Oral daily  gabapentin 300 milliGRAM(s) Oral two times a day  levETIRAcetam 1000 milliGRAM(s) Oral two times a day    MEDICATIONS  (PRN):  acetaminophen     Tablet .. 650 milliGRAM(s) Oral every 6 hours PRN Moderate Pain (4 - 6)  aluminum hydroxide/magnesium hydroxide/simethicone Suspension 30 milliLiter(s) Oral every 6 hours PRN Dyspepsia  haloperidol     Tablet 5 milliGRAM(s) Oral every 6 hours PRN agitation  hydrOXYzine hydrochloride 50 milliGRAM(s) Oral every 8 hours PRN anxiety and insomnia

## 2025-06-05 NOTE — BH INPATIENT PSYCHIATRY PROGRESS NOTE - NSBHMETABOLIC_PSY_ALL_CORE_FT
BMI: BMI (kg/m2): 25 (06-01-25 @ 15:44)  HbA1c:   Glucose: POCT Blood Glucose.: 119 mg/dL (05-31-25 @ 22:39)    BP: 137/88 (06-05-25 @ 09:27) (111/76 - 148/10)Vital Signs Last 24 Hrs  T(C): 36.7 (06-05-25 @ 09:27), Max: 37.3 (06-04-25 @ 17:20)  T(F): 98.1 (06-05-25 @ 09:27), Max: 99.1 (06-04-25 @ 17:20)  HR: 72 (06-05-25 @ 09:27) (62 - 72)  BP: 137/88 (06-05-25 @ 09:27) (137/88 - 147/94)  BP(mean): --  RR: 18 (06-05-25 @ 09:27) (18 - 18)  SpO2: 100% (06-05-25 @ 09:27) (100% - 100%)      Lipid Panel:  BMI: BMI (kg/m2): 25 (06-01-25 @ 15:44)  HbA1c:   Glucose: POCT Blood Glucose.: 119 mg/dL (05-31-25 @ 22:39)    BP: 119/80 (06-09-25 @ 08:42) (119/80 - 149/87)Vital Signs Last 24 Hrs  T(C): 37 (06-09-25 @ 08:42), Max: 37 (06-09-25 @ 08:42)  T(F): 98.6 (06-09-25 @ 08:42), Max: 98.6 (06-09-25 @ 08:42)  HR: 75 (06-09-25 @ 08:42) (75 - 82)  BP: 119/80 (06-09-25 @ 08:42) (119/80 - 141/73)  BP(mean): --  RR: --  SpO2: 100% (06-09-25 @ 08:42) (95% - 100%)      Lipid Panel:

## 2025-06-05 NOTE — BH TREATMENT PLAN - NSTXPLANTHERAPYSESSIONSFT_PSY_ALL_CORE
06-04-25  Type of therapy: Music therapy  Type of session: Group  Level of patient participation: Engaged, Participates  Duration of participation: 45 minutes  Therapy conducted by: Psych rehab  Therapy Summary: Pt attended Music Therapy group and participated fully and appropriately.  Pt remained for the duration of the session and was engaged throughout.  Pt played instruments and was expressive as he played.  Pt was attentive to the group's music and was interactive with others.  Pt engaged in discussion about the music and shared how he enjoyed playing the drums.  Pt stated that he had only played the drums on one other occasion, when he was 13 years old, and how he loved it then as well.

## 2025-06-06 RX ADMIN — HYDROXYZINE HYDROCHLORIDE 50 MILLIGRAM(S): 25 TABLET, FILM COATED ORAL at 21:04

## 2025-06-06 RX ADMIN — GABAPENTIN 300 MILLIGRAM(S): 400 CAPSULE ORAL at 21:04

## 2025-06-06 RX ADMIN — ESCITALOPRAM OXALATE 15 MILLIGRAM(S): 20 TABLET ORAL at 10:15

## 2025-06-06 RX ADMIN — GABAPENTIN 300 MILLIGRAM(S): 400 CAPSULE ORAL at 10:15

## 2025-06-06 RX ADMIN — LEVETIRACETAM 1000 MILLIGRAM(S): 10 INJECTION, SOLUTION INTRAVENOUS at 21:05

## 2025-06-06 NOTE — BH INPATIENT PSYCHIATRY PROGRESS NOTE - NSBHCHARTREVIEWVS_PSY_A_CORE FT
Vital Signs Last 24 Hrs  T(C): 36.8 (06-06-25 @ 17:20), Max: 36.9 (06-06-25 @ 09:05)  T(F): 98.3 (06-06-25 @ 17:20), Max: 98.5 (06-06-25 @ 09:05)  HR: 67 (06-06-25 @ 17:20) (67 - 69)  BP: 149/87 (06-06-25 @ 17:20) (120/79 - 149/87)  BP(mean): --  RR: 18 (06-06-25 @ 09:05) (18 - 18)  SpO2: 99% (06-06-25 @ 17:20) (99% - 99%)     Vital Signs Last 24 Hrs  T(C): 37 (06-09-25 @ 08:42), Max: 37 (06-09-25 @ 08:42)  T(F): 98.6 (06-09-25 @ 08:42), Max: 98.6 (06-09-25 @ 08:42)  HR: 75 (06-09-25 @ 08:42) (75 - 82)  BP: 119/80 (06-09-25 @ 08:42) (119/80 - 141/73)  BP(mean): --  RR: --  SpO2: 100% (06-09-25 @ 08:42) (95% - 100%)
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
## 2025-06-06 NOTE — BH INPATIENT PSYCHIATRY PROGRESS NOTE - NSBHMETABOLIC_PSY_ALL_CORE_FT
BMI: BMI (kg/m2): 25 (06-01-25 @ 15:44)  HbA1c:   Glucose: POCT Blood Glucose.: 119 mg/dL (05-31-25 @ 22:39)    BP: 149/87 (06-06-25 @ 17:20) (120/79 - 149/87)Vital Signs Last 24 Hrs  T(C): 36.8 (06-06-25 @ 17:20), Max: 36.9 (06-06-25 @ 09:05)  T(F): 98.3 (06-06-25 @ 17:20), Max: 98.5 (06-06-25 @ 09:05)  HR: 67 (06-06-25 @ 17:20) (67 - 69)  BP: 149/87 (06-06-25 @ 17:20) (120/79 - 149/87)  BP(mean): --  RR: 18 (06-06-25 @ 09:05) (18 - 18)  SpO2: 99% (06-06-25 @ 17:20) (99% - 99%)      Lipid Panel:  BMI: BMI (kg/m2): 25 (06-01-25 @ 15:44)  HbA1c:   Glucose: POCT Blood Glucose.: 119 mg/dL (05-31-25 @ 22:39)    BP: 119/80 (06-09-25 @ 08:42) (119/80 - 149/87)Vital Signs Last 24 Hrs  T(C): 37 (06-09-25 @ 08:42), Max: 37 (06-09-25 @ 08:42)  T(F): 98.6 (06-09-25 @ 08:42), Max: 98.6 (06-09-25 @ 08:42)  HR: 75 (06-09-25 @ 08:42) (75 - 82)  BP: 119/80 (06-09-25 @ 08:42) (119/80 - 141/73)  BP(mean): --  RR: --  SpO2: 100% (06-09-25 @ 08:42) (95% - 100%)      Lipid Panel:

## 2025-06-06 NOTE — CHART NOTE - NSCHARTNOTEFT_GEN_A_CORE
This SW sent residential rehab referrals to [facility], [facility], [facility], [facility], & [facility] on 6/6/25. [facility] Marky rep Zach called back on 6/6/25 statin they are recommending a 28-day rehab first due to patient’s recent relapse. This SW & NP Poncho met with the patient on 6/6/25 and updated him on his discharge from the [facility] due to numerous substance use relapses over the course of the past year. The patient & treatment team agreed to pursue 28-day rehabs instead of residential due to his recent relapse on cocaine & alcohol. As housing is a long-term goal, this SW also encouraged the patient to enroll in [facility] Care Coordination. Patient was agreeable, and this SW emailed the [facility] team for follow-up.

## 2025-06-06 NOTE — BH INPATIENT PSYCHIATRY PROGRESS NOTE - NSBHATTESTCOMMENTATTENDFT_PSY_A_CORE
Documentation by [facility] was reviewed and I agree with plan. I also saw patient during the day alongside ALLA. Medication regimen was addressed as well as aftercare planning. Patient was informed that he is no longer welcomed to return to his mandated drug program due to his numerous relapses and rehabs. He is agree with plan for short term rehab with option of longer term rehab following completion. Noted to be pleasant, reasonable and without concerns.
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
## 2025-06-07 RX ADMIN — LEVETIRACETAM 1000 MILLIGRAM(S): 10 INJECTION, SOLUTION INTRAVENOUS at 20:56

## 2025-06-07 RX ADMIN — HYDROXYZINE HYDROCHLORIDE 50 MILLIGRAM(S): 25 TABLET, FILM COATED ORAL at 20:57

## 2025-06-07 RX ADMIN — GABAPENTIN 300 MILLIGRAM(S): 400 CAPSULE ORAL at 20:56

## 2025-06-07 RX ADMIN — Medication 2 MILLIGRAM(S): at 15:38

## 2025-06-07 RX ADMIN — LEVETIRACETAM 1000 MILLIGRAM(S): 10 INJECTION, SOLUTION INTRAVENOUS at 10:25

## 2025-06-07 RX ADMIN — GABAPENTIN 300 MILLIGRAM(S): 400 CAPSULE ORAL at 10:25

## 2025-06-07 RX ADMIN — ESCITALOPRAM OXALATE 15 MILLIGRAM(S): 20 TABLET ORAL at 10:25

## 2025-06-08 RX ADMIN — GABAPENTIN 300 MILLIGRAM(S): 400 CAPSULE ORAL at 21:05

## 2025-06-08 RX ADMIN — ESCITALOPRAM OXALATE 15 MILLIGRAM(S): 20 TABLET ORAL at 10:22

## 2025-06-08 RX ADMIN — LEVETIRACETAM 1000 MILLIGRAM(S): 10 INJECTION, SOLUTION INTRAVENOUS at 10:21

## 2025-06-08 RX ADMIN — GABAPENTIN 300 MILLIGRAM(S): 400 CAPSULE ORAL at 10:22

## 2025-06-08 RX ADMIN — HYDROXYZINE HYDROCHLORIDE 50 MILLIGRAM(S): 25 TABLET, FILM COATED ORAL at 16:57

## 2025-06-08 RX ADMIN — LEVETIRACETAM 1000 MILLIGRAM(S): 10 INJECTION, SOLUTION INTRAVENOUS at 21:05

## 2025-06-09 RX ORDER — GABAPENTIN 400 MG/1
300 CAPSULE ORAL THREE TIMES A DAY
Refills: 0 | Status: DISCONTINUED | OUTPATIENT
Start: 2025-06-09 | End: 2025-06-12

## 2025-06-09 RX ADMIN — LEVETIRACETAM 1000 MILLIGRAM(S): 10 INJECTION, SOLUTION INTRAVENOUS at 09:52

## 2025-06-09 RX ADMIN — GABAPENTIN 300 MILLIGRAM(S): 400 CAPSULE ORAL at 21:36

## 2025-06-09 RX ADMIN — ESCITALOPRAM OXALATE 15 MILLIGRAM(S): 20 TABLET ORAL at 09:52

## 2025-06-09 RX ADMIN — HYDROXYZINE HYDROCHLORIDE 50 MILLIGRAM(S): 25 TABLET, FILM COATED ORAL at 21:36

## 2025-06-09 RX ADMIN — GABAPENTIN 300 MILLIGRAM(S): 400 CAPSULE ORAL at 09:52

## 2025-06-09 RX ADMIN — HYDROXYZINE HYDROCHLORIDE 50 MILLIGRAM(S): 25 TABLET, FILM COATED ORAL at 10:23

## 2025-06-09 RX ADMIN — LEVETIRACETAM 1000 MILLIGRAM(S): 10 INJECTION, SOLUTION INTRAVENOUS at 21:36

## 2025-06-09 NOTE — CHART NOTE - NSCHARTNOTEFT_GEN_A_CORE
This SW sent referrals to the following inpatient substance use rehabs on 6/9/25: [facility] (Fax: [phone]; Phone: [phone]), [facility] (Phone: [phone];  Fax: [phone]), [facility] (Phone: [phone]; Fax: [phone]), and [facility] ([email or web address]). This SW received an email back from Holly Lewis PA-C, at [facility] on 6/9/25 stating that the patient is psychiatrically & medically cleared for rehab assessment, pending insurance review. This SW also received a VM from Harper ([phone]) at [facility] on 6/9/25. This SW left a follow-up VM for Harper. The [facility] team also informed this SW that the patient is linked to another Health Home, [facility]; this SW will update the patient. SW to remain available. yes...

## 2025-06-09 NOTE — BH INPATIENT PSYCHIATRY PROGRESS NOTE - CURRENT MEDICATION
MEDICATIONS  (STANDING):  escitalopram 15 milliGRAM(s) Oral daily  gabapentin 300 milliGRAM(s) Oral two times a day  levETIRAcetam 1000 milliGRAM(s) Oral two times a day    MEDICATIONS  (PRN):  acetaminophen     Tablet .. 650 milliGRAM(s) Oral every 6 hours PRN Moderate Pain (4 - 6)  aluminum hydroxide/magnesium hydroxide/simethicone Suspension 30 milliLiter(s) Oral every 6 hours PRN Dyspepsia  haloperidol     Tablet 5 milliGRAM(s) Oral every 6 hours PRN agitation  hydrOXYzine hydrochloride 50 milliGRAM(s) Oral every 8 hours PRN anxiety and insomnia   MEDICATIONS  (STANDING):  escitalopram 15 milliGRAM(s) Oral daily  gabapentin 300 milliGRAM(s) Oral three times a day  levETIRAcetam 1000 milliGRAM(s) Oral two times a day    MEDICATIONS  (PRN):  acetaminophen     Tablet .. 650 milliGRAM(s) Oral every 6 hours PRN Moderate Pain (4 - 6)  aluminum hydroxide/magnesium hydroxide/simethicone Suspension 30 milliLiter(s) Oral every 6 hours PRN Dyspepsia  haloperidol     Tablet 5 milliGRAM(s) Oral every 6 hours PRN agitation  hydrOXYzine hydrochloride 50 milliGRAM(s) Oral every 8 hours PRN anxiety and insomnia

## 2025-06-09 NOTE — BH INPATIENT PSYCHIATRY PROGRESS NOTE - PRN MEDS
MEDICATIONS  (PRN):  acetaminophen     Tablet .. 650 milliGRAM(s) Oral every 6 hours PRN Moderate Pain (4 - 6)  aluminum hydroxide/magnesium hydroxide/simethicone Suspension 30 milliLiter(s) Oral every 6 hours PRN Dyspepsia  haloperidol     Tablet 5 milliGRAM(s) Oral every 6 hours PRN agitation  hydrOXYzine hydrochloride 50 milliGRAM(s) Oral every 8 hours PRN anxiety and insomnia

## 2025-06-09 NOTE — BH INPATIENT PSYCHIATRY PROGRESS NOTE - NSBHATTESTCOMMENTATTENDFT_PSY_A_CORE
Documentation by [facility] was reviewed and I agree with plan. I also saw patient during the day alongside ALLA. Medication regimen was addressed as well as aftercare planning. Patient was informed that he is no longer welcomed to return to his mandated drug program due to his numerous relapses and rehabs. He is agree with plan for short term rehab with option of longer term rehab following completion. Noted to be pleasant, reasonable and without concerns.  Documentation by PA-s was reviewed and I agree with plan. Aftercare planning in effect
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
## 2025-06-09 NOTE — BH INPATIENT PSYCHIATRY PROGRESS NOTE - NSBHCHARTREVIEWVS_PSY_A_CORE FT
Vital Signs Last 24 Hrs  T(C): 37.1 (06-09-25 @ 17:12), Max: 37.1 (06-09-25 @ 17:12)  T(F): 98.8 (06-09-25 @ 17:12), Max: 98.8 (06-09-25 @ 17:12)  HR: 98 (06-09-25 @ 17:12) (75 - 98)  BP: 106/74 (06-09-25 @ 17:12) (106/74 - 141/73)  BP(mean): --  RR: --  SpO2: 97% (06-09-25 @ 17:12) (95% - 100%)     Vital Signs Last 24 Hrs  T(C): 37.2 (06-10-25 @ 08:28), Max: 37.2 (06-10-25 @ 08:28)  T(F): 98.9 (06-10-25 @ 08:28), Max: 98.9 (06-10-25 @ 08:28)  HR: 84 (06-10-25 @ 08:28) (84 - 98)  BP: 114/73 (06-10-25 @ 08:28) (106/74 - 114/73)  BP(mean): --  RR: 18 (06-10-25 @ 08:28) (18 - 18)  SpO2: 100% (06-10-25 @ 08:28) (97% - 100%)

## 2025-06-09 NOTE — BH INPATIENT PSYCHIATRY PROGRESS NOTE - NSBHMETABOLIC_PSY_ALL_CORE_FT
BMI: BMI (kg/m2): 25 (06-01-25 @ 15:44)  HbA1c:   Glucose: POCT Blood Glucose.: 119 mg/dL (05-31-25 @ 22:39)    BP: 106/74 (06-09-25 @ 17:12) (106/74 - 141/73)Vital Signs Last 24 Hrs  T(C): 37.1 (06-09-25 @ 17:12), Max: 37.1 (06-09-25 @ 17:12)  T(F): 98.8 (06-09-25 @ 17:12), Max: 98.8 (06-09-25 @ 17:12)  HR: 98 (06-09-25 @ 17:12) (75 - 98)  BP: 106/74 (06-09-25 @ 17:12) (106/74 - 141/73)  BP(mean): --  RR: --  SpO2: 97% (06-09-25 @ 17:12) (95% - 100%)      Lipid Panel:  BMI: BMI (kg/m2): 25 (06-01-25 @ 15:44)  HbA1c:   Glucose: POCT Blood Glucose.: 119 mg/dL (05-31-25 @ 22:39)    BP: 114/73 (06-10-25 @ 08:28) (106/74 - 141/73)Vital Signs Last 24 Hrs  T(C): 37.2 (06-10-25 @ 08:28), Max: 37.2 (06-10-25 @ 08:28)  T(F): 98.9 (06-10-25 @ 08:28), Max: 98.9 (06-10-25 @ 08:28)  HR: 84 (06-10-25 @ 08:28) (84 - 98)  BP: 114/73 (06-10-25 @ 08:28) (106/74 - 114/73)  BP(mean): --  RR: 18 (06-10-25 @ 08:28) (18 - 18)  SpO2: 100% (06-10-25 @ 08:28) (97% - 100%)      Lipid Panel:

## 2025-06-10 RX ORDER — GABAPENTIN 400 MG/1
1 CAPSULE ORAL
Qty: 0 | Refills: 0 | DISCHARGE
Start: 2025-06-10

## 2025-06-10 RX ORDER — ESCITALOPRAM OXALATE 20 MG/1
3 TABLET ORAL
Qty: 0 | Refills: 0 | DISCHARGE
Start: 2025-06-10

## 2025-06-10 RX ORDER — HYDROXYZINE HYDROCHLORIDE 25 MG/1
1 TABLET, FILM COATED ORAL
Qty: 0 | Refills: 0 | DISCHARGE
Start: 2025-06-10

## 2025-06-10 RX ORDER — LEVETIRACETAM 10 MG/ML
1 INJECTION, SOLUTION INTRAVENOUS
Qty: 0 | Refills: 0 | DISCHARGE
Start: 2025-06-10

## 2025-06-10 RX ADMIN — LEVETIRACETAM 1000 MILLIGRAM(S): 10 INJECTION, SOLUTION INTRAVENOUS at 21:09

## 2025-06-10 RX ADMIN — HYDROXYZINE HYDROCHLORIDE 50 MILLIGRAM(S): 25 TABLET, FILM COATED ORAL at 21:10

## 2025-06-10 RX ADMIN — HYDROXYZINE HYDROCHLORIDE 50 MILLIGRAM(S): 25 TABLET, FILM COATED ORAL at 09:52

## 2025-06-10 RX ADMIN — ESCITALOPRAM OXALATE 15 MILLIGRAM(S): 20 TABLET ORAL at 09:49

## 2025-06-10 RX ADMIN — GABAPENTIN 300 MILLIGRAM(S): 400 CAPSULE ORAL at 21:09

## 2025-06-10 RX ADMIN — LEVETIRACETAM 1000 MILLIGRAM(S): 10 INJECTION, SOLUTION INTRAVENOUS at 09:49

## 2025-06-10 RX ADMIN — GABAPENTIN 300 MILLIGRAM(S): 400 CAPSULE ORAL at 09:50

## 2025-06-10 RX ADMIN — GABAPENTIN 300 MILLIGRAM(S): 400 CAPSULE ORAL at 12:48

## 2025-06-10 NOTE — BH DISCHARGE NOTE NURSING/SOCIAL WORK/PSYCH REHAB - PATIENT PORTAL LINK FT
You can access the FollowMyHealth Patient Portal offered by [facility] by registering at the following website: [email or web address]. By joining [facility] FollowMyHealth portal, you will also be able to view your health information using other applications (apps) compatible with our system.

## 2025-06-10 NOTE — BH INPATIENT PSYCHIATRY PROGRESS NOTE - NSBHMETABOLIC_PSY_ALL_CORE_FT
BMI: BMI (kg/m2): 25 (06-01-25 @ 15:44)  HbA1c:   Glucose: POCT Blood Glucose.: 119 mg/dL (05-31-25 @ 22:39)    BP: 114/73 (06-10-25 @ 08:28) (106/74 - 141/73)Vital Signs Last 24 Hrs  T(C): 37.2 (06-10-25 @ 08:28), Max: 37.2 (06-10-25 @ 08:28)  T(F): 98.9 (06-10-25 @ 08:28), Max: 98.9 (06-10-25 @ 08:28)  HR: 84 (06-10-25 @ 08:28) (84 - 98)  BP: 114/73 (06-10-25 @ 08:28) (106/74 - 114/73)  BP(mean): --  RR: 18 (06-10-25 @ 08:28) (18 - 18)  SpO2: 100% (06-10-25 @ 08:28) (97% - 100%)      Lipid Panel:  BMI: BMI (kg/m2): 25 (06-01-25 @ 15:44)  HbA1c:   Glucose: POCT Blood Glucose.: 119 mg/dL (05-31-25 @ 22:39)    BP: 110/72 (06-11-25 @ 17:10) (105/73 - 136/86)Vital Signs Last 24 Hrs  T(C): 36.9 (06-11-25 @ 17:10), Max: 37 (06-11-25 @ 07:57)  T(F): 98.5 (06-11-25 @ 17:10), Max: 98.6 (06-11-25 @ 07:57)  HR: 120 (06-11-25 @ 17:10) (86 - 120)  BP: 110/72 (06-11-25 @ 17:10) (105/73 - 110/72)  BP(mean): --  RR: --  SpO2: 97% (06-11-25 @ 17:10) (97% - 99%)      Lipid Panel:

## 2025-06-10 NOTE — BH DISCHARGE NOTE NURSING/SOCIAL WORK/PSYCH REHAB - NSDCCRNAME_GEN_ALL_CORE_FT
Behavioral Health Rapid Transition (BHRT) Team / Noam Graf ([phone]): Short-Term Care Coordinator   [facility]al Health Rapid Transition (BHRT) Team / Noam Graf ([phone]): Short-Term Care Coordination  [facility] / Tl Roberts ([phone]) / Long-Term Care Coordination

## 2025-06-10 NOTE — BH INPATIENT PSYCHIATRY PROGRESS NOTE - NSBHATTESTCOMMENTATTENDFT_PSY_A_CORE
Documentation by PA-s was reviewed and I agree with plan. Aftercare planning in effect Documentation by [facility] was reviewed and I agree with plan. I also saw pt individually at bedside, in good mood, has no complaints.

## 2025-06-10 NOTE — BH DISCHARGE NOTE NURSING/SOCIAL WORK/PSYCH REHAB - FINANCIAL ASSISTANCE
[facility] provides services at a reduced cost to those who are determined to be eligible through [facility]’s financial assistance program. Information regarding [facility]’s financial assistance program can be found by going to [email or web address] or by calling [phone].

## 2025-06-10 NOTE — BH INPATIENT PSYCHIATRY PROGRESS NOTE - CURRENT MEDICATION
Detail Level: Detailed MEDICATIONS  (STANDING):  escitalopram 15 milliGRAM(s) Oral daily  gabapentin 300 milliGRAM(s) Oral three times a day  levETIRAcetam 1000 milliGRAM(s) Oral two times a day    MEDICATIONS  (PRN):  acetaminophen     Tablet .. 650 milliGRAM(s) Oral every 6 hours PRN Moderate Pain (4 - 6)  aluminum hydroxide/magnesium hydroxide/simethicone Suspension 30 milliLiter(s) Oral every 6 hours PRN Dyspepsia  haloperidol     Tablet 5 milliGRAM(s) Oral every 6 hours PRN agitation  hydrOXYzine hydrochloride 50 milliGRAM(s) Oral every 8 hours PRN anxiety and insomnia

## 2025-06-10 NOTE — BH DISCHARGE NOTE NURSING/SOCIAL WORK/PSYCH REHAB - NSCDUDCCRISIS_PSY_A_CORE
[facility]  1 (790) [facility] ([phone])  Text "WELL" to 50426  Website: www.BlogCN.EdPuzzle/.National Suicide Prevention Lifeline 5 (244) 995-2852/.  [employer]  1 (842) LIFENET (037-6256)/988 Suicide and Crisis Lifeline

## 2025-06-10 NOTE — BH DISCHARGE NOTE NURSING/SOCIAL WORK/PSYCH REHAB - NSDCADDINFO1FT_PSY_ALL_CORE
A Medicaid Taxi will pick you up from [facility] on 6/11/25 at 10:30AM upon hospital discharge and transfer you to the [facility] (28-day inpatient substance use program).  You have been accepted to the [facility] (28-day inpatient substance use program) for admission on 6/11/25. Please arrive by 12PM.  You have been accepted to the [facility] (28-day inpatient substance use program) for admission on 6/12/25. Please arrive by 12PM.

## 2025-06-10 NOTE — CHART NOTE - NSCHARTNOTEFT_GEN_A_CORE
[facility] has accepted the patient for admission on 6/11/25 at 12PM. The patient would like to be discharged at 9AM tomorrow via Medicaid cab so he can grab his belongings from the [facility] first, and then he will make his way via public transportation independently to [facility] by 12PM.  This SW left a VM for [facility] Director Fallon Roberts (Residence Address: [address] [place] [address]; Phone: [phone]; Email: [email or web address]) on 6/10/25 notifying her of the patient’s plan to  his belongings. This SW later spoke with Fallon via phone who stated she would have the patient’s belongings ready for pick-up at 9:30AM on 6/11/25. This SW also called BERNADETTE Ochoa (Phone: [phone]) and notified him of the plan for discharge tomorrow to inpatient rehab. He is agreeable with the discharge plan.     This SW called [facility] ([phone]) on 6/10/25 and spoke with representative Kenyon who arranged a Medicaid taxi for hospital discharge pick-up on 6/11/25 @ 9AM to the [facility] ([address] [place] [address]) so that the patient can pick-up his clothing & other belongings. The patient will independently take public transportation to the [facility] after picking up his belongings. The patient understands that he must arrive to the inpatient rehab by 12PM on 6/11/25. [employer]: [employer] (Phone: [phone]). Trip Confirmation #: [phone]. SW to remain available.

## 2025-06-10 NOTE — BH DISCHARGE NOTE NURSING/SOCIAL WORK/PSYCH REHAB - NSPROPASSIVESMOKEEXPOSURE_GEN_A_NUR
Physician Progress Note      PATIENT:               Shahida Sunshine  CSN #:                  [id number]  :                       1940  ADMIT DATE:       3/5/2022 3:19 PM  [address] [place] DATE:  RESPONDING  PROVIDER #:        Mona North MD          QUERY TEXT:    Pt admitted with elevated troponin. Noted documentation of Type 2 MI on CV consult 3/6/22 by ordered Cardiology consultant. If possible, please document in progress notes and discharge summary:    The medical record reflects the following:  Risk Factors: [de-identified] y/o male to ED for Low blood glucose, found to have elevated troponins, prolonged QT per EKG  PMHx: CAD, HTN, DM2, elevated cholesterol    Clinical Indicators:  3/5 H&P: Elevated troponin, POA  ECG: Normal sinus rhythm. Possible Left atrial enlargement  Septal infarct , age undetermined. Prolonged QT  Troponin: 110, 137, trend    3/6 CV Consult - elevated troponin  Trop elevation is likely type 2 With underlying CAD, do not suspect ACS, no CP or significant EKG abn    3/5/2022 15:48 Troponin-High Sensitivity: 110 ()    3/5/2022 17:40 Troponin-High Sensitivity: 137 ([facility])    3/6/2022 03:23 Troponin-High Sensitivity: 215 ()    3/5/22 EKG: Normal sinus rhythm ; Possible Left atrial enlargement: Septal infarct , age undetermined: Prolonged QT    Echo Pending    Treatment: Hospitalist consult, admit, Cardiology consult, EKG, Echo, Labs - troponin,  Options provided:  -- Type 2 MI confirmed present on admission  -- Type 2 MI confirmed not present on admission  -- Type 2 MI ruled out  -- Other - I will add my own diagnosis  -- Disagree - Not applicable / Not valid  -- Disagree - Clinically unable to determine / Unknown  -- Refer to Clinical Documentation Reviewer    PROVIDER RESPONSE TEXT:    The diagnosis of Type 2 MI was confirmed as present on admission.     Query created by: Trinity Burns on 3/8/2022 1:19 PM      Electronically signed by:  Mona North MD 3/8/2022 1:54 PM No
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
## 2025-06-10 NOTE — BH DISCHARGE NOTE NURSING/SOCIAL WORK/PSYCH REHAB - NSDCPRGOAL_PSY_ALL_CORE
Pt attended select groups during admission and participated appropriately.  Pt demonstrated moderate range of affect and was pleasant on approach.  Pt was sometimes quiet in groups but able to engage in discussion and was particularly receptive to 1:1 therapy.  Pt was social with select peers.  Pt endorsed improvement in mood and felt that his hospitalization was helpful.  Pt endorsed wanting to continue to work on sobriety and improving his mental health.  Pt was future-focused and looking forward to rehab.  Pt endorsed readiness for discharge and completed a safety plan.

## 2025-06-10 NOTE — BH INPATIENT PSYCHIATRY PROGRESS NOTE - NSBHCHARTREVIEWVS_PSY_A_CORE FT
Vital Signs Last 24 Hrs  T(C): 37.2 (06-10-25 @ 08:28), Max: 37.2 (06-10-25 @ 08:28)  T(F): 98.9 (06-10-25 @ 08:28), Max: 98.9 (06-10-25 @ 08:28)  HR: 84 (06-10-25 @ 08:28) (84 - 98)  BP: 114/73 (06-10-25 @ 08:28) (106/74 - 114/73)  BP(mean): --  RR: 18 (06-10-25 @ 08:28) (18 - 18)  SpO2: 100% (06-10-25 @ 08:28) (97% - 100%)     Vital Signs Last 24 Hrs  T(C): 36.9 (06-11-25 @ 17:10), Max: 37 (06-11-25 @ 07:57)  T(F): 98.5 (06-11-25 @ 17:10), Max: 98.6 (06-11-25 @ 07:57)  HR: 120 (06-11-25 @ 17:10) (86 - 120)  BP: 110/72 (06-11-25 @ 17:10) (105/73 - 110/72)  BP(mean): --  RR: --  SpO2: 97% (06-11-25 @ 17:10) (97% - 99%)

## 2025-06-10 NOTE — BH DISCHARGE NOTE NURSING/SOCIAL WORK/PSYCH REHAB - MODE OF TRANSPORTATION
Medicaid Transportation [employer]: [employer] [address] [employer]   Phone: [phone]  Trip Confirmation #: [id number]/Medicaid Transportation

## 2025-06-11 LAB — SARS-COV-2 RNA SPEC QL NAA+PROBE: SIGNIFICANT CHANGE UP

## 2025-06-11 RX ADMIN — LEVETIRACETAM 1000 MILLIGRAM(S): 10 INJECTION, SOLUTION INTRAVENOUS at 09:04

## 2025-06-11 RX ADMIN — GABAPENTIN 300 MILLIGRAM(S): 400 CAPSULE ORAL at 09:04

## 2025-06-11 RX ADMIN — HYDROXYZINE HYDROCHLORIDE 50 MILLIGRAM(S): 25 TABLET, FILM COATED ORAL at 09:39

## 2025-06-11 RX ADMIN — ESCITALOPRAM OXALATE 15 MILLIGRAM(S): 20 TABLET ORAL at 09:04

## 2025-06-11 RX ADMIN — HYDROXYZINE HYDROCHLORIDE 50 MILLIGRAM(S): 25 TABLET, FILM COATED ORAL at 21:14

## 2025-06-11 RX ADMIN — GABAPENTIN 300 MILLIGRAM(S): 400 CAPSULE ORAL at 21:14

## 2025-06-11 RX ADMIN — LEVETIRACETAM 1000 MILLIGRAM(S): 10 INJECTION, SOLUTION INTRAVENOUS at 21:14

## 2025-06-11 RX ADMIN — GABAPENTIN 300 MILLIGRAM(S): 400 CAPSULE ORAL at 13:38

## 2025-06-11 NOTE — BH INPATIENT PSYCHIATRY PROGRESS NOTE - NSBHFUPINTERVALCCFT_PSY_A_CORE
"I'm neutral." 
'I'm cool'
"I'm pretty neutral." 
"Very tired."
"I'm doing fine."
"I'm good." 
"I caught a seizure."

## 2025-06-11 NOTE — BH INPATIENT PSYCHIATRY DISCHARGE NOTE - NSDCMRMEDTOKEN_GEN_ALL_CORE_FT
escitalopram 5 mg oral tablet: 3 tab(s) orally once a day  gabapentin 300 mg oral capsule: 1 cap(s) orally 3 times a day  hydrOXYzine hydrochloride 50 mg oral tablet: 1 tab(s) orally every 8 hours As needed anxiety and insomnia  levETIRAcetam 1000 mg oral tablet: 1 tab(s) orally 2 times a day
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
## 2025-06-11 NOTE — BH INPATIENT PSYCHIATRY DISCHARGE NOTE - NSBHFUPINTERVALHXFT_PSY_A_CORE
Pt seen in his room, sleeping on arrival but awoke to verbal stimulation. Pt dressed in hospital administered patient scrubs, with appropriate hygiene. Pt describes his mood as "I'm pretty neutral." Pt reports his sleep is "great, now that my gabapentin went up." Pt reports his appetite is "good, I eat as much as I should." Pt denies SI/HI/AVH/PI. Pt denies medication side effects. Pt reports that he is tolerating the gabapentin increase well, and that he is no longer feeling like it is "wearing off" in the middle of the day.   Per staff: calm, cooperative, received PRN Atarax due to anxiety symptoms.  Pt seen in his bedroom, standing up and cleaning up his things, wearing his hospital administered clothing, calm and cooperative. Pt reports mood today is "good, better than neutral." Reports his sleep last night was "very good." Reports his appetite has been "good." Denies SI/HI/AVH/PI. Denies medication side effects. Denies acute medical concerns. Reports he is excited about discharge, stating "I'm ready for the next step."  Patient looking forward to discharge this morning. No si/hi/avh or PI. Anticipating going to residence to get his belongings before going to rehab

## 2025-06-11 NOTE — BH INPATIENT PSYCHIATRY DISCHARGE NOTE - REASON FOR ADMISSION
This is a 64y/o single, unemployed  male domiciled in a long term mandated substance abuse drug program. Medical hx pertinent for seizure disorder, currently on Keppra. Psychiatric hx significant for four prior psychiatric hospitalizations, last at [facility] in 2021, diagnoses include substance use disorder, opioid use disorder on methadone, depression, anxiety. Patient presents to [facility] by EMS for altered mental status after being found unresponsive on the street, given 16mg IN Narcan by [place]. Endorsed that he had recently snorted drugs. While in the ED he endorsed having si/hi, stating that if he were to be discharged he would get hit by a truck or hurt someone-denied prior suicide attempts. Utox positive for cocaine, methadone, fentanyl. Admitted ot [facility] voluntary status.

## 2025-06-11 NOTE — BH INPATIENT PSYCHIATRY DISCHARGE NOTE - NSBHMETABOLIC_PSY_ALL_CORE_FT
BMI: BMI (kg/m2): 25 (06-01-25 @ 15:44)  HbA1c:   Glucose: POCT Blood Glucose.: 119 mg/dL (05-31-25 @ 22:39)    BP: 136/86 (06-10-25 @ 16:42) (106/74 - 141/73)Vital Signs Last 24 Hrs  T(C): 36.8 (06-10-25 @ 16:42), Max: 36.8 (06-10-25 @ 16:42)  T(F): 98.2 (06-10-25 @ 16:42), Max: 98.2 (06-10-25 @ 16:42)  HR: 69 (06-10-25 @ 16:42) (69 - 69)  BP: 136/86 (06-10-25 @ 16:42) (136/86 - 136/86)  BP(mean): --  RR: --  SpO2: 98% (06-10-25 @ 16:42) (98% - 98%)      Lipid Panel:

## 2025-06-11 NOTE — BH INPATIENT PSYCHIATRY PROGRESS NOTE - NSICDXBHSECONDARYDX_PSY_ALL_CORE
Anxiety   F41.9  Opioid dependence on agonist therapy   F11.20  Substance use disorder   F19.90  MDD (major depressive disorder)   F32.9  Seizure disorder   G40.909  

## 2025-06-11 NOTE — BH INPATIENT PSYCHIATRY DISCHARGE NOTE - NSACTIVEVENT_PSY_ALL_CORE
Legal problems/Current or pending social isolation/Substance intoxication or withdrawal/Inadequate social supports

## 2025-06-11 NOTE — BH INPATIENT PSYCHIATRY PROGRESS NOTE - NSBHFUPINTERVALHXFT_PSY_A_CORE
Patient seen in milieu today, states that he hasn't been able to enjoy going to groups because it is very cold on the uint. States that he is withdrawing from methadone, however does not want to restart it. Has no others concerns or complaints. No acute medical issues. Recs from epilepsy is appreciated and keppra is restarted and gabapentin increased to 300mg bid. Patient reports that mood is 'good' and denies sxs of depression or anxiety 'I'm cool.' Sleep and appetite are unremarkable. Patient reports having a court appearance on Friday and wanting to ensure that appropriate documentation would indicate that he is hospitalized (so that he is not penalized for not appearing.
Patient seen lying in bed, in hospital gown, sleeping on arrival but woke to verbal commands, calm and cooperative . Patient describes his mood as "neutral." Denies SI/HI/AVH/PI. Patient reports he is still not sleeping well, reporting that Keppra "makes me toss and turn all night" and states "I know the Methadone is still in my system" and reports he believes that is also "probably why I can't sleep." Patient declined Keppra again this morning and reports it is because "I should only be getting 500 twice a day, they're giving me too much." Patient reports that his gabapentin has made him "too sleepy" throughout the day, and is requesting to take it all at night. Reports this upsets him because he would like to attend groups but he has been too tired to attend. Patient reports his appetite is good because "the food is so good here." Denies any acute medical issues.   Per staff: engaging in groups, socializing well with other patients, shows insight into his condition and is very future- oriented, discussing how he needs structure before he returns to society. 
Pt seen in his room, cleaning up around his bed and desk, dressed in his own clothing, calm and cooperative. Pt reports his mood is "cool." Pt reports his sleep last night was "alright, I was thinking about discharge today which made me toss and turn a little bit." Reports his appetite has been "real good." Denies SI/HI/AVH/PI. Denies medication side effects, and reports his medications "have helped a lot." Pt denies any acute medical concerns. When asked pt what he feels has changed since his admission, he reports "I feel like I've grown a lot mentally. I finally got to share my real story for the first time." After originally seeing the pt, his discharge date was pushed from today to 6/12 due to transportation issues that would have limited his ability to arrive at the rehab facility on time. Pt was informed and was agreeable to pushing discharge to tomorrow. 
Pt seen in his room, sleeping on arrival but awoke to verbal stimulation. Pt dressed in hospital administered patient scrubs, with appropriate hygiene. Pt describes his mood as "I'm pretty neutral." Pt reports his sleep is "great, now that my gabapentin went up." Pt reports his appetite is "good, I eat as much as I should." Pt denies SI/HI/AVH/PI. Pt denies medication side effects. Pt reports that he is tolerating the gabapentin increase well, and that he is no longer feeling like it is "wearing off" in the middle of the day.   Per staff: calm, cooperative, received PRN Atarax due to anxiety symptoms. 
Pt seen with LILI Isaac. Pt seen in his room, sleeping on arrival but awoke to verbal stimulation. Pt dressed in own clothes over hospital administered patient scrubs. Pt describes his mood as "neutral." Pt reports his sleep is "coming along good", and reports that he is no longer having difficulty sleeping due to his medications. Pt reports his appetite is "normal." Pt denies SI/HI/AVH/PI. Pt denies medication side effects. Pt reports his only medical concern is that he feels his gabapentin wears off in the middle of the day, and is requesting to receive it three times per day.   Per staff: pt's roommate has been trying to get into verbal altercations with patient, although patient seems unbothered by these attempts. Focused on gabapentin and states he needs it in order to combat the feelings of "methadone withdrawal." 
No acute interval events.    On exam, patient is calm, cooperative, euthymic. He is mood-incongruent and actually reports that he feels depressed. He reports that he is here because he "caught a seizure." He initially denies SI, but when I inquire as to whether he reported SI when he was in the emergency department, he confirms this was the case and tells me "if I was going to do something, I wouldn't tell anyone, but right now I don't feel like hurting myself." He denies HI/AH/VH. Patient reports good sleep and appetite. Denies side effects to medication and tolerating Lexapro.    Regarding his seizures, he is quite vague -- he reports that sometimes he shakes, sometimes he has a dream that something will happen and he knows he is having a seizure. He reports that he has been on a high dose of Keppra (unknown presriber), and that he receives it from a pharmacy on "[address]," however after calling multiple pharmacies in the area, no such prescription found. After discussion with neurology consult, recommended to discontinue Keppra tonight and then obtain EEG to evaluate for seizures, followed by official neurology consult recommendations tomorrow. I explained this plan to patient, and he was in agreement.
Patient seen with LILI Isaac. Patient seen lying in bed, in hospital gown, calm and cooperative. Patient describes his mood as "neutral." Patient reports that he did not sleep well due to his medications, reporting that "Keppra and gabapentin mixed and made me not fall asleep." Patient reports he was "tossing and turning all night" and requests that he no longer gets his Keppra because he "the people who looked at my brain said nothing was wrong." Patient reports he is still withdrawing from methadone but does not express interest in restarting it. Patient denies any other side effects to his medications. Denies any acute medical issues. Recs from epilepsy appreciated and will restart Keppra this evening and have patient follow up with neurology outpatient to safely titrate off of the Keppra.  Pt reports good appetite and states "I eat good." Denies SI/HI/AVH/PI. When asked about what he feels has changed since his admission, pt reports he "feels like the groups help me a lot, they help me learn how to talk to people."   Per staff: cooperative, guarded at times, attended 12 step.

## 2025-06-11 NOTE — BH INPATIENT PSYCHIATRY DISCHARGE NOTE - HOSPITAL COURSE
in progress    MEDICATIONS  (STANDING): Continue taking on 6/11/25  escitalopram 15 milliGRAM(s) Oral daily  gabapentin 300 milliGRAM(s) Oral three times a day  levETIRAcetam 1000 milliGRAM(s) Oral two times a day    MEDICATIONS  (PRN): Continue taking on 6/11/25  hydrOXYzine hydrochloride 50 milliGRAM(s) Oral every 8 hours PRN anxiety and insomnia   6/4: increased lexapro to 15mg daily for depressive symptoms. increased gabapentin from 200mg BID to 300mg BID.  c/w Keppra 1000mg BID for seizures. Haldol 5mg / Ativan 2mg PRN agitation  6/5: Patient reports improved mood. c/w Lexapro 15mg daily for depressive symptoms. C/w gabapentin 300mg daily for seizures. EEG on 6/5 was unremarkable. Patient requested to stop Keppra 1000mg BID, however, epilepsy team recommends pt continues taking Keppra 1000mg BID until he sees a neurologist outpatient who can safely titrate the patient off of Keppra.   6/6: Patient declined Keppra this AM, reported it was because of his insomnia last night. Pt advised to c/w Keppra 1000mg until he can be safely titrated off in an outpt setting. C/w gabapentin 300mg bid for seizures and anxiety.   6/9: Increased gabapentin to 300mg TID. c/w Keppra 1000mg. c/w Lexapro 15mg daily for depressive symptoms. c/w Atarax 50mg po q8h PRN for anxiety. C/w Ativan 2 mg  q6h PRN for agitation  6/10: Discharge planned for 6/11 to rehab program. C/w gabapentin 300mg TID, c/w Keppra 1000mg. c/w Lexapro 15mg daily for depressive symptoms. c/w Atarax 50mg po q8h PRN for anxiety. C/w Ativan 2 mg  q6h PRN for agitation.     MEDICATIONS  (STANDING): Continue taking on 6/12/25  escitalopram 15 milliGRAM(s) Oral daily  gabapentin 300 milliGRAM(s) Oral three times a day  levETIRAcetam 1000 milliGRAM(s) Oral two times a day    MEDICATIONS  (PRN): Continue taking on 6/12/25  hydrOXYzine hydrochloride 50 milliGRAM(s) Oral every 8 hours PRN anxiety and insomnia    Visible on the unit throughout admission attending select groups and appropriately interacting with peers and staff. Medication adjustments were made as appropriate and patient stabilized. His prior program declined to take him and felt that he would benefit from inpatient rehab. Patient reported feeling optimistic and hopeful about his future, grateful for treatment. Patient did have questions about his keppra dosage and was seen by epilepsy, EEG was unremarkable with recommendation to have patient continue current dosage.

## 2025-06-11 NOTE — BH INPATIENT PSYCHIATRY PROGRESS NOTE - NSDCCRITERIA_PSY_ALL_CORE
No SI, affective stability
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
## 2025-06-11 NOTE — BH INPATIENT PSYCHIATRY PROGRESS NOTE - CURRENT MEDICATION
MEDICATIONS  (STANDING):  escitalopram 15 milliGRAM(s) Oral daily  gabapentin 300 milliGRAM(s) Oral three times a day  levETIRAcetam 1000 milliGRAM(s) Oral two times a day    MEDICATIONS  (PRN):  acetaminophen     Tablet .. 650 milliGRAM(s) Oral every 6 hours PRN Moderate Pain (4 - 6)  aluminum hydroxide/magnesium hydroxide/simethicone Suspension 30 milliLiter(s) Oral every 6 hours PRN Dyspepsia  haloperidol     Tablet 5 milliGRAM(s) Oral every 6 hours PRN agitation  hydrOXYzine hydrochloride 50 milliGRAM(s) Oral every 8 hours PRN anxiety and insomnia

## 2025-06-11 NOTE — BH INPATIENT PSYCHIATRY DISCHARGE NOTE - NSDCCPCAREPLAN_GEN_ALL_CORE_FT
PRINCIPAL DISCHARGE DIAGNOSIS  Diagnosis: MDD (major depressive disorder)  Assessment and Plan of Treatment: Continue medication regimen and follow up with aftercare planning      SECONDARY DISCHARGE DIAGNOSES  Diagnosis: Cocaine use disorder  Assessment and Plan of Treatment: Continue medication regimen and follow up with aftercare planning    Diagnosis: Seizure disorder  Assessment and Plan of Treatment: Continue medication regimen and follow up with aftercare planning

## 2025-06-11 NOTE — BH INPATIENT PSYCHIATRY PROGRESS NOTE - NSBHATTESTBILLING_PSY_A_CORE
55608-Oyjtfehzic OBS or IP - moderate complexity OR 35-49 mins
26361-Zokbqqznnl OBS or IP - moderate complexity OR 35-49 mins
60157-Ktlcwolkup OBS or IP - moderate complexity OR 35-49 mins
35838-Nubuudynuq OBS or IP - moderate complexity OR 35-49 mins
66446-Nxdfulibug OBS or IP - moderate complexity OR 35-49 mins
75268-Zoljdbwfyw OBS or IP - moderate complexity OR 35-49 mins
38624-Crykitaggv OBS or IP - moderate complexity OR 35-49 mins

## 2025-06-11 NOTE — CHART NOTE - NSCHARTNOTEFT_GEN_A_CORE
Discharge postponed to 6/12/25. This SW confirmed with [facility] that patient can be admitted to their facility on 6/12/25. [employer] cab to be rescheduled for pick-up on 6/12/25 @ 9AM. NP Poncho, PA Sung Shelby, and [facility] Peer Specialist Darren met with the patient together in the morning on 6/11/25 to review the plan. The patient is aware and agreeable. SW to remain available. Discharge postponed to 6/12/25. This SW confirmed with [facility] that patient can be admitted to their facility on 6/12/25 by 12PM. This SW called [employer] & spoke with representative Elsie who rescheduled the Medicaid cab ([employer]: [employer]; Phone: [phone]; Trip Confirmation #: [id number]) for pick-up on 6/12/25 @ 9AM. NP Poncho, PA Sung Shelby, and [facility] Peer Specialist Darren met with the patient together in the morning on 6/11/25 to review the plan. The patient is aware and agreeable. SW to remain available.

## 2025-06-11 NOTE — CHART NOTE - NSCHARTNOTESELECT_GEN_ALL_CORE
Epilepsy/Event Note
Event Note
Nutrition Services

## 2025-06-11 NOTE — BH INPATIENT PSYCHIATRY PROGRESS NOTE - NSBHATTESTCOMMENTATTENDFT_PSY_A_CORE
Documentation by PA-s was reviewed and I agree with plan. Aftercare planning in effect Documentation by PASivas was reviewed and I agree with plan. I also spoke with patient individually on the milieu, noted to be well related, euthymic and future oriented. Is anticipating discharge tomorrow morning. No si/hi/avh or PI

## 2025-06-11 NOTE — BH INPATIENT PSYCHIATRY PROGRESS NOTE - NSTXSUICIDINTERMD_PSY_ALL_CORE
psychopharmacology [id number]
psychopharmacology [id number]
psychopharmacology [id number]
psychopharmacology [id number]
psychopharmacology [id number]
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
## 2025-06-11 NOTE — CHART NOTE - NSCHARTNOTEFT_GEN_A_CORE
This SW received a call from [facility] Clinician Raiza Wang on 6/11/25. She was notified of patient's discharge tomorrow to [facility] (with patient's verbal consent). She was agreeable with the discharge plan. SW to remain available.     Raiza Milligan, MS  Mental Health Clinician/  [facility] [address], [address]  [place] [address]  O- [phone]   [phone]

## 2025-06-11 NOTE — BH INPATIENT PSYCHIATRY DISCHARGE NOTE - NSBHDCMEDICALFT_PSY_A_CORE
Seen by neurology/epilepsy team..... Seen by neurology/epilepsy team during admission with unremarkable  EEG

## 2025-06-11 NOTE — BH INPATIENT PSYCHIATRY DISCHARGE NOTE - NSDCPROCEDURESFT_PSY_ALL_CORE
EEG unremarkable EEG unremarkable  Acquisition Details:  Electroencephalography was acquired using a minimum of 21 channels on an Wi3 Neurology system v 9.3.1 with electrode placement according to the standard International 10-20 system following ACNS (American Clinical Neurophysiology Society) guidelines.  Anterior temporal T1 and T2 electrodes were utilized whenever possible.  The XLTEK automated spike & seizure detections were all reviewed in detail, in addition to the entire raw EEG.    Findings:  Background:  continuous, with predominantly alpha and beta frequencies.  Generalized Slowing:  None  Symmetry/Focality: No persistent asymmetries of voltage or frequency.      Voltage:  Normal (20+ uV)  Organization:  Appropriate anterior-posterior gradient  Posterior Dominant Rhythm:  10 Hz symmetric, well-organized, and well-modulated  Sleep:  Symmetric, synchronous spindles and K complexes.  Variability:   Yes		Reactivity:  Yes    Spontaneous Activity:  No epileptiform discharges   Events:  1)	No electrographic seizures or significant clinical events occurred during this study.  Provocations:  •	Hyperventilation: was not performed.  •	Photic stimulation: was not performed.  FINAL Impression:  Normalawake and/or drowsy routine EEG  1)	 Unremarkable awake recording.  2)	There were no findings of active epilepsy, however this alone does not rule out the diagnosis.

## 2025-06-11 NOTE — BH INPATIENT PSYCHIATRY PROGRESS NOTE - NSBHCHARTREVIEWVS_PSY_A_CORE FT
Vital Signs Last 24 Hrs  T(C): 37 (06-11-25 @ 07:57), Max: 37 (06-11-25 @ 07:57)  T(F): 98.6 (06-11-25 @ 07:57), Max: 98.6 (06-11-25 @ 07:57)  HR: 86 (06-11-25 @ 07:57) (69 - 86)  BP: 105/73 (06-11-25 @ 07:57) (105/73 - 136/86)  BP(mean): --  RR: --  SpO2: 99% (06-11-25 @ 07:57) (98% - 99%)     Vital Signs Last 24 Hrs  T(C): 36.9 (06-11-25 @ 17:10), Max: 37 (06-11-25 @ 07:57)  T(F): 98.5 (06-11-25 @ 17:10), Max: 98.6 (06-11-25 @ 07:57)  HR: 120 (06-11-25 @ 17:10) (86 - 120)  BP: 110/72 (06-11-25 @ 17:10) (105/73 - 110/72)  BP(mean): --  RR: --  SpO2: 97% (06-11-25 @ 17:10) (97% - 99%)

## 2025-06-11 NOTE — BH INPATIENT PSYCHIATRY PROGRESS NOTE - NSBHMETABOLIC_PSY_ALL_CORE_FT
BMI: BMI (kg/m2): 25 (06-01-25 @ 15:44)  HbA1c:   Glucose: POCT Blood Glucose.: 119 mg/dL (05-31-25 @ 22:39)    BP: 105/73 (06-11-25 @ 07:57) (105/73 - 141/73)Vital Signs Last 24 Hrs  T(C): 37 (06-11-25 @ 07:57), Max: 37 (06-11-25 @ 07:57)  T(F): 98.6 (06-11-25 @ 07:57), Max: 98.6 (06-11-25 @ 07:57)  HR: 86 (06-11-25 @ 07:57) (69 - 86)  BP: 105/73 (06-11-25 @ 07:57) (105/73 - 136/86)  BP(mean): --  RR: --  SpO2: 99% (06-11-25 @ 07:57) (98% - 99%)      Lipid Panel:  BMI: BMI (kg/m2): 25 (06-01-25 @ 15:44)  HbA1c:   Glucose: POCT Blood Glucose.: 119 mg/dL (05-31-25 @ 22:39)    BP: 110/72 (06-11-25 @ 17:10) (105/73 - 136/86)Vital Signs Last 24 Hrs  T(C): 36.9 (06-11-25 @ 17:10), Max: 37 (06-11-25 @ 07:57)  T(F): 98.5 (06-11-25 @ 17:10), Max: 98.6 (06-11-25 @ 07:57)  HR: 120 (06-11-25 @ 17:10) (86 - 120)  BP: 110/72 (06-11-25 @ 17:10) (105/73 - 110/72)  BP(mean): --  RR: --  SpO2: 97% (06-11-25 @ 17:10) (97% - 99%)      Lipid Panel:
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
## 2025-06-11 NOTE — BH INPATIENT PSYCHIATRY PROGRESS NOTE - NSBHMSEAFFQUAL_PSY_A_CORE
Euthymic/Anxious

## 2025-06-12 VITALS
OXYGEN SATURATION: 98 % | TEMPERATURE: 99 F | HEART RATE: 89 BPM | DIASTOLIC BLOOD PRESSURE: 68 MMHG | SYSTOLIC BLOOD PRESSURE: 99 MMHG

## 2025-06-12 RX ADMIN — GABAPENTIN 300 MILLIGRAM(S): 400 CAPSULE ORAL at 08:43

## 2025-06-12 RX ADMIN — LEVETIRACETAM 1000 MILLIGRAM(S): 10 INJECTION, SOLUTION INTRAVENOUS at 08:43

## 2025-06-12 RX ADMIN — ESCITALOPRAM OXALATE 15 MILLIGRAM(S): 20 TABLET ORAL at 08:43

## 2025-06-12 RX ADMIN — HYDROXYZINE HYDROCHLORIDE 50 MILLIGRAM(S): 25 TABLET, FILM COATED ORAL at 09:49

## 2025-06-13 NOTE — BH SOCIAL WORK CONFIRMATION FOLLOW UP NOTE - NSCOMMENTS_PSY_ALL_CORE
Caring Call: Chart reviewed on 6/13/25. As patient is Low Acute Suicide Risk; no Caring Call required. SW to remain available.      Linkage Call: Chart reviewed on 6/13/25. This SW emailed [facility] representative Holly ([email or web address]) on 6/13/25 asking if she could verify that the patient arrived to their rehab on 6/12/25 upon hospital discharge. SW to remain available.

## 2025-06-19 DIAGNOSIS — F32.9 MAJOR DEPRESSIVE DISORDER, SINGLE EPISODE, UNSPECIFIED: ICD-10-CM

## 2025-06-19 DIAGNOSIS — G40.909 EPILEPSY, UNSPECIFIED, NOT INTRACTABLE, WITHOUT STATUS EPILEPTICUS: ICD-10-CM

## 2025-06-19 DIAGNOSIS — F11.20 OPIOID DEPENDENCE, UNCOMPLICATED: ICD-10-CM
